# Patient Record
Sex: MALE | Race: WHITE | Employment: OTHER | URBAN - METROPOLITAN AREA
[De-identification: names, ages, dates, MRNs, and addresses within clinical notes are randomized per-mention and may not be internally consistent; named-entity substitution may affect disease eponyms.]

---

## 2021-07-14 ENCOUNTER — HOSPITAL ENCOUNTER (INPATIENT)
Age: 56
LOS: 2 days | Discharge: HOME | DRG: 247 | End: 2021-07-16
Attending: EMERGENCY MEDICINE | Admitting: HOSPITALIST
Payer: OTHER GOVERNMENT

## 2021-07-14 ENCOUNTER — APPOINTMENT (OUTPATIENT)
Dept: CT IMAGING | Age: 56
DRG: 247 | End: 2021-07-14
Payer: OTHER GOVERNMENT

## 2021-07-14 ENCOUNTER — APPOINTMENT (OUTPATIENT)
Dept: GENERAL RADIOLOGY | Age: 56
DRG: 247 | End: 2021-07-14
Payer: OTHER GOVERNMENT

## 2021-07-14 DIAGNOSIS — R07.9 CHEST PAIN, UNSPECIFIED TYPE: Primary | ICD-10-CM

## 2021-07-14 DIAGNOSIS — I70.0 ATHEROSCLEROSIS OF ABDOMINAL AORTA (HCC): ICD-10-CM

## 2021-07-14 DIAGNOSIS — R77.8 ELEVATED TROPONIN: ICD-10-CM

## 2021-07-14 LAB
ALBUMIN SERPL-MCNC: 4.9 GM/DL (ref 3.4–5)
ALP BLD-CCNC: 79 IU/L (ref 40–129)
ALT SERPL-CCNC: 21 U/L (ref 10–40)
ANION GAP SERPL CALCULATED.3IONS-SCNC: 12 MMOL/L (ref 4–16)
AST SERPL-CCNC: 27 IU/L (ref 15–37)
BASOPHILS ABSOLUTE: 0.1 K/CU MM
BASOPHILS RELATIVE PERCENT: 0.5 % (ref 0–1)
BILIRUB SERPL-MCNC: 1 MG/DL (ref 0–1)
BILIRUBIN DIRECT: 0.2 MG/DL (ref 0–0.3)
BILIRUBIN, INDIRECT: 0.8 MG/DL (ref 0–0.7)
BUN BLDV-MCNC: 16 MG/DL (ref 6–23)
CALCIUM SERPL-MCNC: 9.6 MG/DL (ref 8.3–10.6)
CHLORIDE BLD-SCNC: 98 MMOL/L (ref 99–110)
CO2: 26 MMOL/L (ref 21–32)
CREAT SERPL-MCNC: 1.2 MG/DL (ref 0.9–1.3)
DIFFERENTIAL TYPE: ABNORMAL
EOSINOPHILS ABSOLUTE: 0.2 K/CU MM
EOSINOPHILS RELATIVE PERCENT: 1.5 % (ref 0–3)
GFR AFRICAN AMERICAN: >60 ML/MIN/1.73M2
GFR NON-AFRICAN AMERICAN: >60 ML/MIN/1.73M2
GLUCOSE BLD-MCNC: 88 MG/DL (ref 70–99)
HCT VFR BLD CALC: 42.2 % (ref 42–52)
HEMOGLOBIN: 14.1 GM/DL (ref 13.5–18)
IMMATURE NEUTROPHIL %: 0.2 % (ref 0–0.43)
LIPASE: 12 IU/L (ref 13–60)
LYMPHOCYTES ABSOLUTE: 2.2 K/CU MM
LYMPHOCYTES RELATIVE PERCENT: 20.9 % (ref 24–44)
MCH RBC QN AUTO: 30.9 PG (ref 27–31)
MCHC RBC AUTO-ENTMCNC: 33.4 % (ref 32–36)
MCV RBC AUTO: 92.3 FL (ref 78–100)
MONOCYTES ABSOLUTE: 0.8 K/CU MM
MONOCYTES RELATIVE PERCENT: 7.5 % (ref 0–4)
NUCLEATED RBC %: 0 %
PDW BLD-RTO: 12.7 % (ref 11.7–14.9)
PLATELET # BLD: 272 K/CU MM (ref 140–440)
PMV BLD AUTO: 8.9 FL (ref 7.5–11.1)
POTASSIUM SERPL-SCNC: 3.6 MMOL/L (ref 3.5–5.1)
PRO-BNP: 123.8 PG/ML
RBC # BLD: 4.57 M/CU MM (ref 4.6–6.2)
SEGMENTED NEUTROPHILS ABSOLUTE COUNT: 7.2 K/CU MM
SEGMENTED NEUTROPHILS RELATIVE PERCENT: 69.4 % (ref 36–66)
SODIUM BLD-SCNC: 136 MMOL/L (ref 135–145)
TOTAL IMMATURE NEUTOROPHIL: 0.02 K/CU MM
TOTAL NUCLEATED RBC: 0 K/CU MM
TOTAL PROTEIN: 7.2 GM/DL (ref 6.4–8.2)
TROPONIN T: 0.08 NG/ML
TROPONIN T: 0.24 NG/ML
WBC # BLD: 10.4 K/CU MM (ref 4–10.5)

## 2021-07-14 PROCEDURE — 83690 ASSAY OF LIPASE: CPT

## 2021-07-14 PROCEDURE — 6370000000 HC RX 637 (ALT 250 FOR IP): Performed by: PHYSICIAN ASSISTANT

## 2021-07-14 PROCEDURE — 6360000004 HC RX CONTRAST MEDICATION: Performed by: EMERGENCY MEDICINE

## 2021-07-14 PROCEDURE — 80076 HEPATIC FUNCTION PANEL: CPT

## 2021-07-14 PROCEDURE — 85025 COMPLETE CBC W/AUTO DIFF WBC: CPT

## 2021-07-14 PROCEDURE — 93005 ELECTROCARDIOGRAM TRACING: CPT | Performed by: PHYSICIAN ASSISTANT

## 2021-07-14 PROCEDURE — 96374 THER/PROPH/DIAG INJ IV PUSH: CPT

## 2021-07-14 PROCEDURE — 6370000000 HC RX 637 (ALT 250 FOR IP): Performed by: NURSE PRACTITIONER

## 2021-07-14 PROCEDURE — 93005 ELECTROCARDIOGRAM TRACING: CPT | Performed by: EMERGENCY MEDICINE

## 2021-07-14 PROCEDURE — 2140000000 HC CCU INTERMEDIATE R&B

## 2021-07-14 PROCEDURE — 85027 COMPLETE CBC AUTOMATED: CPT

## 2021-07-14 PROCEDURE — 80048 BASIC METABOLIC PNL TOTAL CA: CPT

## 2021-07-14 PROCEDURE — 71045 X-RAY EXAM CHEST 1 VIEW: CPT

## 2021-07-14 PROCEDURE — 36415 COLL VENOUS BLD VENIPUNCTURE: CPT

## 2021-07-14 PROCEDURE — 74174 CTA ABD&PLVS W/CONTRAST: CPT

## 2021-07-14 PROCEDURE — 99285 EMERGENCY DEPT VISIT HI MDM: CPT

## 2021-07-14 PROCEDURE — 83880 ASSAY OF NATRIURETIC PEPTIDE: CPT

## 2021-07-14 PROCEDURE — 6360000002 HC RX W HCPCS: Performed by: PHYSICIAN ASSISTANT

## 2021-07-14 PROCEDURE — 84484 ASSAY OF TROPONIN QUANT: CPT

## 2021-07-14 RX ORDER — TRAMADOL HYDROCHLORIDE 50 MG/1
50 TABLET ORAL EVERY 12 HOURS
Status: DISCONTINUED | OUTPATIENT
Start: 2021-07-14 | End: 2021-07-16 | Stop reason: HOSPADM

## 2021-07-14 RX ORDER — ONDANSETRON 4 MG/1
4 TABLET, ORALLY DISINTEGRATING ORAL EVERY 8 HOURS PRN
Status: DISCONTINUED | OUTPATIENT
Start: 2021-07-14 | End: 2021-07-16 | Stop reason: HOSPADM

## 2021-07-14 RX ORDER — HEPARIN SODIUM 1000 [USP'U]/ML
4000 INJECTION, SOLUTION INTRAVENOUS; SUBCUTANEOUS ONCE
Status: COMPLETED | OUTPATIENT
Start: 2021-07-14 | End: 2021-07-15

## 2021-07-14 RX ORDER — POLYETHYLENE GLYCOL 3350 17 G/17G
17 POWDER, FOR SOLUTION ORAL DAILY PRN
Status: DISCONTINUED | OUTPATIENT
Start: 2021-07-14 | End: 2021-07-16 | Stop reason: HOSPADM

## 2021-07-14 RX ORDER — ONDANSETRON 2 MG/ML
4 INJECTION INTRAMUSCULAR; INTRAVENOUS ONCE
Status: COMPLETED | OUTPATIENT
Start: 2021-07-14 | End: 2021-07-14

## 2021-07-14 RX ORDER — TRAMADOL HYDROCHLORIDE 50 MG/1
50 TABLET ORAL EVERY 12 HOURS
COMMUNITY

## 2021-07-14 RX ORDER — ASPIRIN 81 MG/1
81 TABLET ORAL DAILY
Status: DISCONTINUED | OUTPATIENT
Start: 2021-07-15 | End: 2021-07-15 | Stop reason: SDUPTHER

## 2021-07-14 RX ORDER — HEPARIN SODIUM 10000 [USP'U]/100ML
5-30 INJECTION, SOLUTION INTRAVENOUS CONTINUOUS
Status: DISCONTINUED | OUTPATIENT
Start: 2021-07-14 | End: 2021-07-15

## 2021-07-14 RX ORDER — CLOPIDOGREL BISULFATE 75 MG/1
300 TABLET ORAL ONCE
Status: COMPLETED | OUTPATIENT
Start: 2021-07-14 | End: 2021-07-14

## 2021-07-14 RX ORDER — ONDANSETRON 2 MG/ML
4 INJECTION INTRAMUSCULAR; INTRAVENOUS EVERY 6 HOURS PRN
Status: DISCONTINUED | OUTPATIENT
Start: 2021-07-14 | End: 2021-07-16 | Stop reason: HOSPADM

## 2021-07-14 RX ORDER — CLOPIDOGREL BISULFATE 75 MG/1
75 TABLET ORAL DAILY
Status: DISCONTINUED | OUTPATIENT
Start: 2021-07-15 | End: 2021-07-15 | Stop reason: SDUPTHER

## 2021-07-14 RX ORDER — HEPARIN SODIUM 10000 [USP'U]/100ML
5-30 INJECTION, SOLUTION INTRAVENOUS CONTINUOUS
Status: DISCONTINUED | OUTPATIENT
Start: 2021-07-14 | End: 2021-07-14

## 2021-07-14 RX ORDER — HEPARIN SODIUM 1000 [USP'U]/ML
4000 INJECTION, SOLUTION INTRAVENOUS; SUBCUTANEOUS PRN
Status: DISCONTINUED | OUTPATIENT
Start: 2021-07-14 | End: 2021-07-15

## 2021-07-14 RX ORDER — GABAPENTIN 300 MG/1
300 CAPSULE ORAL 3 TIMES DAILY
Status: DISCONTINUED | OUTPATIENT
Start: 2021-07-14 | End: 2021-07-16 | Stop reason: HOSPADM

## 2021-07-14 RX ORDER — ACETAMINOPHEN 650 MG/1
650 SUPPOSITORY RECTAL EVERY 6 HOURS PRN
Status: DISCONTINUED | OUTPATIENT
Start: 2021-07-14 | End: 2021-07-16 | Stop reason: HOSPADM

## 2021-07-14 RX ORDER — SODIUM CHLORIDE 0.9 % (FLUSH) 0.9 %
5-40 SYRINGE (ML) INJECTION EVERY 12 HOURS SCHEDULED
Status: DISCONTINUED | OUTPATIENT
Start: 2021-07-14 | End: 2021-07-16 | Stop reason: HOSPADM

## 2021-07-14 RX ORDER — HEPARIN SODIUM 1000 [USP'U]/ML
2000 INJECTION, SOLUTION INTRAVENOUS; SUBCUTANEOUS PRN
Status: DISCONTINUED | OUTPATIENT
Start: 2021-07-14 | End: 2021-07-15

## 2021-07-14 RX ORDER — ACETAMINOPHEN 325 MG/1
650 TABLET ORAL EVERY 6 HOURS PRN
Status: DISCONTINUED | OUTPATIENT
Start: 2021-07-14 | End: 2021-07-16 | Stop reason: HOSPADM

## 2021-07-14 RX ORDER — AMLODIPINE BESYLATE 10 MG/1
10 TABLET ORAL DAILY
Status: DISCONTINUED | OUTPATIENT
Start: 2021-07-15 | End: 2021-07-16 | Stop reason: HOSPADM

## 2021-07-14 RX ORDER — SODIUM CHLORIDE 9 MG/ML
25 INJECTION, SOLUTION INTRAVENOUS PRN
Status: DISCONTINUED | OUTPATIENT
Start: 2021-07-14 | End: 2021-07-16 | Stop reason: HOSPADM

## 2021-07-14 RX ORDER — NITROGLYCERIN 0.4 MG/1
0.4 TABLET SUBLINGUAL EVERY 5 MIN PRN
Status: COMPLETED | OUTPATIENT
Start: 2021-07-14 | End: 2021-07-14

## 2021-07-14 RX ORDER — SODIUM CHLORIDE 0.9 % (FLUSH) 0.9 %
5-40 SYRINGE (ML) INJECTION PRN
Status: DISCONTINUED | OUTPATIENT
Start: 2021-07-14 | End: 2021-07-16 | Stop reason: HOSPADM

## 2021-07-14 RX ORDER — ATORVASTATIN CALCIUM 40 MG/1
40 TABLET, FILM COATED ORAL NIGHTLY
Status: DISCONTINUED | OUTPATIENT
Start: 2021-07-14 | End: 2021-07-15

## 2021-07-14 RX ORDER — CLOPIDOGREL BISULFATE 75 MG/1
300 TABLET ORAL DAILY
Status: DISCONTINUED | OUTPATIENT
Start: 2021-07-14 | End: 2021-07-14

## 2021-07-14 RX ORDER — ASPIRIN 81 MG/1
324 TABLET, CHEWABLE ORAL ONCE
Status: COMPLETED | OUTPATIENT
Start: 2021-07-14 | End: 2021-07-14

## 2021-07-14 RX ADMIN — GABAPENTIN 300 MG: 300 CAPSULE ORAL at 23:42

## 2021-07-14 RX ADMIN — ATORVASTATIN CALCIUM 40 MG: 40 TABLET, FILM COATED ORAL at 23:42

## 2021-07-14 RX ADMIN — NITROGLYCERIN 0.4 MG: 0.4 TABLET, ORALLY DISINTEGRATING SUBLINGUAL at 22:09

## 2021-07-14 RX ADMIN — ONDANSETRON 4 MG: 2 INJECTION INTRAMUSCULAR; INTRAVENOUS at 19:02

## 2021-07-14 RX ADMIN — TRAMADOL HYDROCHLORIDE 50 MG: 50 TABLET, FILM COATED ORAL at 23:42

## 2021-07-14 RX ADMIN — CLOPIDOGREL BISULFATE 300 MG: 75 TABLET ORAL at 23:41

## 2021-07-14 RX ADMIN — ASPIRIN 324 MG: 81 TABLET, CHEWABLE ORAL at 19:02

## 2021-07-14 RX ADMIN — IOPAMIDOL 80 ML: 755 INJECTION, SOLUTION INTRAVENOUS at 19:55

## 2021-07-14 RX ADMIN — NITROGLYCERIN 0.4 MG: 0.4 TABLET, ORALLY DISINTEGRATING SUBLINGUAL at 23:30

## 2021-07-14 RX ADMIN — NITROGLYCERIN 0.4 MG: 0.4 TABLET, ORALLY DISINTEGRATING SUBLINGUAL at 19:02

## 2021-07-14 RX ADMIN — SERTRALINE HYDROCHLORIDE 50 MG: 50 TABLET ORAL at 23:58

## 2021-07-14 ASSESSMENT — PAIN SCALES - GENERAL
PAINLEVEL_OUTOF10: 7
PAINLEVEL_OUTOF10: 7

## 2021-07-14 ASSESSMENT — PAIN DESCRIPTION - FREQUENCY: FREQUENCY: CONTINUOUS

## 2021-07-14 ASSESSMENT — PAIN DESCRIPTION - DESCRIPTORS: DESCRIPTORS: PRESSURE

## 2021-07-14 ASSESSMENT — PAIN DESCRIPTION - LOCATION: LOCATION: CHEST;BACK

## 2021-07-14 ASSESSMENT — HEART SCORE: ECG: 0

## 2021-07-14 NOTE — ED PROVIDER NOTES
EMERGENCY DEPARTMENT ENCOUNTER        PCP: Joss Kellogg MD    CHIEF COMPLAINT    Chief Complaint   Patient presents with    Chest Pain     shortness of breath, radiating into back       Of note, this patient was also evaluated by the attending physician, Dr. Zahira Mendez. HPI      Kavin Kirby is a 49 y.o. male former smoker with PMH of HTN and reported history of stroke without deficits who presents with chest pain. Onset -3 days ago  Location -inferior sternal region  Duration -intermittent lasting 10 to 15 minutes at a time  Character -pressure  Aggravating/Alleviating factors -no aggravating or alleviating factors. Patient reports the pain seems to occur at random. He reports the pain woke him up overnight as well last night. Associate symptoms -nausea, diaphoresis, shortness of breath, lightheadedness  Radiation -radiates to upper back  Severity -7 out of 10 in severity when it occurs    Patient reports he has never seen cardiology before. Patient denies fever, chills, emesis. Patient denies lower extremity swelling, recent long travel, history of PE/DVT, exogenous hormone use, recent surgeries/hospitalizations, recent trauma, hemoptysis. REVIEW OF SYSTEMS    Constitutional:  Denies fever, chills. HENT:  Denies sore throat or ear pain   Cardiovascular:  +Chest Pain; Denies palpitations, syncope, extremity edema. Respiratory:  + SOB; Denies cough, hemoptysis    GI:  + nausea;  Denies abdominal pain, vomiting, or diarrhea  :  Denies any urinary symptoms  Musculoskeletal:  Denies back pain, extremity swelling  Skin:  Denies rash  Neurologic:  + lightheadedness; Denies dizziness, headache, focal weakness or sensory changes, vision changes, hearing changes, speech changes, numbness, tingling, memory loss, confusion  Endocrine:  Denies polyuria or polydypsia   Lymphatic:  Denies swollen glands     All other review of systems are negative  See HPI and nursing notes for additional per Week:     Minutes of Exercise per Session:    Stress:     Feeling of Stress :    Social Connections:     Frequency of Communication with Friends and Family:     Frequency of Social Gatherings with Friends and Family:     Attends Sikhism Services:     Active Member of Clubs or Organizations:     Attends Club or Organization Meetings:     Marital Status:    Intimate Partner Violence:     Fear of Current or Ex-Partner:     Emotionally Abused:     Physically Abused:     Sexually Abused:      No family history on file. PHYSICAL EXAM    VITAL SIGNS: /73   Pulse 64   Temp 98.3 °F (36.8 °C) (Oral)   Resp 19   Ht 5' 9\" (1.753 m)   Wt 185 lb (83.9 kg)   SpO2 96%   BMI 27.32 kg/m²    Constitutional:  Well developed, well nourished, no acute distress   HENT:  Atraumatic, moist mucus membranes  Neck/Lymphatics: supple, no JVD, no swollen nodes  Respiratory:  Lungs Clear, no retractions, no wheezing, rhonchi, rales  Cardiovascular:  Rate regular, regular Rhythm,  no murmurs/rubs/gallops. No carotid bruits or murmurs heard in carotids. There is mild tenderness to palpation of the inferior sternal region without overlying erythema or edema. No other chest wall tenderness palpation is present. Vascular: Radial pulses and DP pulses 2+ equal bilaterally  GI:  Soft, nontender, normal bowel sounds. No palpable masses. Musculoskeletal:  No edema, no deformities. No thigh/calf tenderness to palpation bilaterally. Compartments are soft in bilateral lower extremities.   Integument:  Skin warm and dry, no petechiae     Neurologic:    - Alert & oriented person, place, time, and situation, no speech difficulties or slurring.  - No obvious gross motor deficits  - Cranial nerves 2-12 grossly intact  - Negative meningeal signs.  - Sensation intact to light touch  - Strength 5/5 in upper and lower extremities bilaterally  - Normal finger to nose test bilaterally  - Rapid alternating movements intact      Psych: Pleasant affect, no hallucinations        LABS:  Results for orders placed or performed during the hospital encounter of 07/14/21   CBC Auto Differential   Result Value Ref Range    WBC 10.4 4.0 - 10.5 K/CU MM    RBC 4.57 (L) 4.6 - 6.2 M/CU MM    Hemoglobin 14.1 13.5 - 18.0 GM/DL    Hematocrit 42.2 42 - 52 %    MCV 92.3 78 - 100 FL    MCH 30.9 27 - 31 PG    MCHC 33.4 32.0 - 36.0 %    RDW 12.7 11.7 - 14.9 %    Platelets 322 311 - 445 K/CU MM    MPV 8.9 7.5 - 11.1 FL    Differential Type AUTOMATED DIFFERENTIAL     Segs Relative 69.4 (H) 36 - 66 %    Lymphocytes % 20.9 (L) 24 - 44 %    Monocytes % 7.5 (H) 0 - 4 %    Eosinophils % 1.5 0 - 3 %    Basophils % 0.5 0 - 1 %    Segs Absolute 7.2 K/CU MM    Lymphocytes Absolute 2.2 K/CU MM    Monocytes Absolute 0.8 K/CU MM    Eosinophils Absolute 0.2 K/CU MM    Basophils Absolute 0.1 K/CU MM    Nucleated RBC % 0.0 %    Total Nucleated RBC 0.0 K/CU MM    Total Immature Neutrophil 0.02 K/CU MM    Immature Neutrophil % 0.2 0 - 0.43 %   Basic Metabolic Panel w/ Reflex to MG   Result Value Ref Range    Sodium 136 135 - 145 MMOL/L    Potassium 3.6 3.5 - 5.1 MMOL/L    Chloride 98 (L) 99 - 110 mMol/L    CO2 26 21 - 32 MMOL/L    Anion Gap 12 4 - 16    BUN 16 6 - 23 MG/DL    CREATININE 1.2 0.9 - 1.3 MG/DL    Glucose 88 70 - 99 MG/DL    Calcium 9.6 8.3 - 10.6 MG/DL    GFR Non-African American >60 >60 mL/min/1.73m2    GFR African American >60 >60 mL/min/1.73m2   Troponin   Result Value Ref Range    Troponin T 0.084 (H) <0.01 NG/ML   Brain Natriuretic Peptide   Result Value Ref Range    Pro-.8 <300 PG/ML   Lipase   Result Value Ref Range    Lipase 12 (L) 13 - 60 IU/L   Hepatic Function Panel (LFTs)   Result Value Ref Range    Albumin 4.9 3.4 - 5.0 GM/DL    Total Bilirubin 1.0 0.0 - 1.0 MG/DL    Bilirubin, Direct 0.2 0.0 - 0.3 MG/DL    Bilirubin, Indirect 0.8 (H) 0 - 0.7 MG/DL    Alkaline Phosphatase 79 40 - 129 IU/L    AST 27 15 - 37 IU/L    ALT 21 10 - 40 U/L Total Protein 7.2 6.4 - 8.2 GM/DL         EKG: EKG Interpretation  Please see ED physician's note for EKG interpretation- Dr. Casie Oneill    Repeat EKG also obtained-see ED physician's note for EKG interpretation, Dr. Jodell Duverney   Final Result   1. No acute aortic injury or dissection involving either the thoracic or   abdominal aorta. Moderate to severe atherosclerosis of the abdominal aorta. 2. No acute intrathoracic or intra-abdominal process identified. XR CHEST PORTABLE   Final Result   1. No acute cardiopulmonary process identified. ED COURSE & MEDICAL DECISION MAKING       Vital signs and nursing notes reviewed during ED course. Patient care and presentation staffed with and seen in conjunction with supervising physician, Dr. Casie Oneill. Patient presents as above. Patient placed on telemetry monitoring as well as continuous pulse oximetry monitoring. While in the ED today, labs, imaging, and EKG were obtained. For EKG interpretations- please see ED physician's note. Labs reveal troponin is elevated at 0.084 but otherwise not clinically significant derangements. Chest xray obtained today and reveals no acute cardiopulmonary process. No pneumothorax, no consolidation concerning for pneumonia, no pleural effusion. Pulses are equal in all extremities, no ripping or tearing pain, no widened mediastinum, but given chest pain radiating to back will obtain CTA chest abdomen pelvis to rule out dissection. HEART score at least 4.   Given patient's elevated troponin I did consult with the on-call cardiologist as patient has no cardiology preference and discussed patient's case with Dr. Benjy Rodriguez.  We discussed patient's labs including elevated troponin, EKG was sent via [employer], and we discussed patient's signs and symptoms and past medical history that she recommends aspirin and trending of troponin at this time as he reports the EKG looks normal.  He does not wish to start heparin at this time. If patient's next troponin enzyme is rising then he would consider heparin. CTA chest, abdomen, pelvis reveals no acute aortic injury or dissection involving either the thoracic or abdominal aorta. There is moderate to severe atherosclerosis of the abdominal aorta present. No acute intrathoracic or intra-abdominal process identified per radiologist read. Patient treated with aspirin and nitro in the ED for chest pain with improvement. Patient will require admission for trending of troponins, serial EKGs, cardiology to see patient as well as further evaluation care. Therefore I consulted with hospitalist and we discussed the patient's case. Hospitalist, NATHAN MAX [facility] NP, agrees to admit the patient at this time for further evaluation and care for ACS rule out. All pertinent Lab data and radiographic results reviewed with patient at bedside. The patient and/or the family were informed of the results of any tests/labs/imaging, the treatment plan, and time was allotted to answer questions. Diagnosis, disposition, and treatment plan reviewed in detail with patient who understands and agrees to admission at this time. See chart for details of medications given during the ED stay. Clinical  IMPRESSION    1. Chest pain, unspecified type    2. Elevated troponin    3. Atherosclerosis of abdominal aorta (HCC)        PLAN:  Admission to the hospital    Comment: Please note this report has been produced using speech recognition software and may contain errors related to that system including errors in grammar, punctuation, and spelling, as well as words and phrases that may be inappropriate. If there are any questions or concerns please feel free to contact the dictating provider for clarification.        Lawayne Felty, PA-C  07/14/21 4433

## 2021-07-14 NOTE — ED PROVIDER NOTES
EKG Interpretation    Interpreted by emergency department physician from July 14 at 1702    Rhythm: normal sinus   Rate: normal  Axis: normal  Ectopy: none  Conduction: normal  ST Segments: no acute change  T Waves: no acute change  Q Waves: none    Clinical Impression: Normal sinus rhythm with a rate of 79 and a QTC of 451 with no clear ischemia or ectopy    MD Jose Nelson MD  07/14/21 1706    ATTENDING NOTE:    I discussed this patient's history and physical findings and reviewed the PA's findings with them, as well as performed an independent assessment and coordinated care with them. My additional findings are: Patient is currently with minimal chest discomfort but states that his chest pain has become more frequent and more severe over the past 3 days with the episodes lasting longer. Addendum: Troponin continuing to climb, hospitalist notified by PA as the patient has already been hospitalized per the hospitalist at time of this result. HISTORY OF PRESENT ILLNESS:  Chief Complaint   Patient presents with    Chest Pain     shortness of breath, radiating into back   . Vladislav Yan is a 49 y.o. male who presents with episodic chest pain, that is increasing in frequency and severity. When he has the chest pain is substernal, crushing with radiation to both shoulders and the posterior back. He also has pain at the epigastrium. He denies any trauma, fevers. When he has the pain he has dry heaving, shortness of breath. He denies any known history of DVT or PE.      PHYSICAL EXAM:  VITAL SIGNS:   ED Triage Vitals [07/14/21 1655]   Enc Vitals Group      /71      Pulse 75      Resp 16      Temp 98.3 °F (36.8 °C)      Temp Source Oral      SpO2 98 %      Weight 185 lb (83.9 kg)      Height 5' 9\" (1.753 m)      Head Circumference       Peak Flow       Pain Score       Pain Loc       Pain Edu? Excl. in [address]?       Vitals during ED course were reviewed and are as charted. Key Physical Exam Findings:    Constitutional: Family at bedside, patient active, pleasant    Eyes:  Conjunctiva normal, No discharge    HENT: Normocephalic, Atraumatic, Bilateral external ears normal, posterior oropharynx is nonerythematous and without exudate, uvula is midline, no trismus, no \"hot potato voice\" or dysphonia, oropharynx moist    Neck: Supple, no stridor, no grossly visible or palpable masses    Cardiovascular: Regular rate and rhythm, No murmurs, No rubs, No gallops    Pulmonary/Chest:  Normal breath sounds, No respiratory distress or accessory muscle use, No wheezing, crackles or rhonchi. Abdomen:  Soft, nondistended and nonrigid, No tenderness or peritoneal signs, No masses, normal bowel sounds    Back:  No midline point tenderness, No paraspinous muscle tenderness.   No CVA tenderness    Extremities:  No gross deformities, no edema, no tenderness    Neurologic:  Normal motor function, Normal sensory function, No focal deficits    Skin:  Warm, Dry, No erythema, No rash, No cyanosis, No mottling    Lymphatic:  No lymphadenopathy in the following location(s): cervical    Psychiatric:  Alert and oriented x3, Affect normal      EKG Interpretation    Interpreted by emergency department physician from July 14 at [address]: normal sinus   Rate: normal  Axis: normal  Ectopy: none  Conduction: normal  ST Segments: nonspecific changes  T Waves: no acute change  Q Waves: none    Clinical Impression: Normal sinus rhythm with a rate of 70, some movement, not having significant chest pain at time of repeat EKG, cardiology consulted    King Peoples MD      RADIOLOGY/PROCEDURES/LABS/MEDICATIONS ADMINISTERED:    I have reviewed and interpreted all of the currently available lab results from this visit (if applicable):  Results for orders placed or performed during the hospital encounter of 07/14/21   CBC Auto Differential   Result Value Ref Range    WBC 10.4 4.0 - 10.5 K/CU MM    RBC 4.57 (L) 4.6 - 6.2 M/CU MM    Hemoglobin 14.1 13.5 - 18.0 GM/DL    Hematocrit 42.2 42 - 52 %    MCV 92.3 78 - 100 FL    MCH 30.9 27 - 31 PG    MCHC 33.4 32.0 - 36.0 %    RDW 12.7 11.7 - 14.9 %    Platelets 762 283 - 745 K/CU MM    MPV 8.9 7.5 - 11.1 FL    Differential Type AUTOMATED DIFFERENTIAL     Segs Relative 69.4 (H) 36 - 66 %    Lymphocytes % 20.9 (L) 24 - 44 %    Monocytes % 7.5 (H) 0 - 4 %    Eosinophils % 1.5 0 - 3 %    Basophils % 0.5 0 - 1 %    Segs Absolute 7.2 K/CU MM    Lymphocytes Absolute 2.2 K/CU MM    Monocytes Absolute 0.8 K/CU MM    Eosinophils Absolute 0.2 K/CU MM    Basophils Absolute 0.1 K/CU MM    Nucleated RBC % 0.0 %    Total Nucleated RBC 0.0 K/CU MM    Total Immature Neutrophil 0.02 K/CU MM    Immature Neutrophil % 0.2 0 - 0.43 %   Basic Metabolic Panel w/ Reflex to MG   Result Value Ref Range    Sodium 136 135 - 145 MMOL/L    Potassium 3.6 3.5 - 5.1 MMOL/L    Chloride 98 (L) 99 - 110 mMol/L    CO2 26 21 - 32 MMOL/L    Anion Gap 12 4 - 16    BUN 16 6 - 23 MG/DL    CREATININE 1.2 0.9 - 1.3 MG/DL    Glucose 88 70 - 99 MG/DL    Calcium 9.6 8.3 - 10.6 MG/DL    GFR Non-African American >60 >60 mL/min/1.73m2    GFR African American >60 >60 mL/min/1.73m2   Troponin   Result Value Ref Range    Troponin T 0.084 (H) <0.01 NG/ML   Brain Natriuretic Peptide   Result Value Ref Range    Pro-.8 <300 PG/ML   Lipase   Result Value Ref Range    Lipase 12 (L) 13 - 60 IU/L   Hepatic Function Panel (LFTs)   Result Value Ref Range    Albumin 4.9 3.4 - 5.0 GM/DL    Total Bilirubin 1.0 0.0 - 1.0 MG/DL    Bilirubin, Direct 0.2 0.0 - 0.3 MG/DL    Bilirubin, Indirect 0.8 (H) 0 - 0.7 MG/DL    Alkaline Phosphatase 79 40 - 129 IU/L    AST 27 15 - 37 IU/L    ALT 21 10 - 40 U/L    Total Protein 7.2 6.4 - 8.2 GM/DL   EKG 12 Lead   Result Value Ref Range    Ventricular Rate 79 BPM    Atrial Rate 79 BPM    P-R Interval 178 ms    QRS Duration 106 ms    Q-T Interval 394 ms    QTc Calculation (Bazett) 451 ms    P Axis 39 IV  IP CONSULT TO CARDIOLOGY  IP CONSULT TO HOSPITALIST    I have personally viewed the imaging studies. The radiologist interpretation is:   CTA CHEST ABDOMEN PELVIS W CONTRAST   Final Result   1. No acute aortic injury or dissection involving either the thoracic or   abdominal aorta. Moderate to severe atherosclerosis of the abdominal aorta. 2. No acute intrathoracic or intra-abdominal process identified. XR CHEST PORTABLE   Final Result   1. No acute cardiopulmonary process identified. MEDICATIONS ADMINISTERED:  Medications   nitroGLYCERIN (NITROSTAT) SL tablet 0.4 mg (0.4 mg Sublingual Given 7/14/21 1902)   aspirin chewable tablet 324 mg (324 mg Oral Given 7/14/21 1902)   ondansetron (ZOFRAN) injection 4 mg (4 mg Intravenous Given 7/14/21 1902)   iopamidol (ISOVUE-370) 76 % injection 80 mL (80 mLs Intravenous Given 7/14/21 1955)         PLAN/ED COURSE:  Last Vitals: /73   Pulse 64   Temp 98.3 °F (36.8 °C) (Oral)   Resp 19   Ht 5' 9\" (1.753 m)   Wt 185 lb (83.9 kg)   SpO2 96%   BMI 27.32 kg/m²       81-year-old male presents with chest pain. The nature of his chest pain is very worrisome and he has a history of previous vascular disease and his CT scan does show arteriosclerotic disease of the aorta. We did consult cardiology based on the severity of his signs and symptoms and his nonzero troponin. He was feeling improved while in the department here, he was asked to alert us if his chest pain recurred so that we could repeat a stat EKG. I rechecked the patient x3 during his stay in the department and he is resting comfortably. His nausea and pain are controlled. His CT scan does not show evidence of aortic dissection, PE, pneumothorax. I have concerned this is underlying cardiac disease, angina or ACS. Patient be hospitalized per the hospitalist, see PA note for discussions regarding plans with cardiology.     Critical care time of 18 minutes, excluding other billable procedures. This included multiple reevaluations, advanced imaging, laboratory studies and plan for disposition. Clinical Impression:  1. Chest pain, unspecified type    2. Elevated troponin    3. Atherosclerosis of abdominal aorta ([address]        Disposition referral (if applicable):  No follow-up provider specified.     Disposition medications (if applicable):  New Prescriptions    No medications on file       ED Provider Disposition Time  DISPOSITION Decision To Admit 07/14/2021 08:52:08 PM            Electronically signed by Lisa Callahan MD on 7/14/2021 at 9:48 PM       Lisa Callahan MD  07/14/21 8116       Lisa Callahan MD  07/14/21 3601

## 2021-07-15 LAB
ACTIVATED CLOTTING TIME, LOW RANGE: >400 SEC
APTT: 107.8 SECONDS (ref 25.1–37.1)
APTT: 31.4 SECONDS (ref 25.1–37.1)
APTT: 43.3 SECONDS (ref 25.1–37.1)
APTT: >240 SECONDS (ref 25.1–37.1)
EKG ATRIAL RATE: 65 BPM
EKG ATRIAL RATE: 70 BPM
EKG ATRIAL RATE: 71 BPM
EKG ATRIAL RATE: 79 BPM
EKG DIAGNOSIS: NORMAL
EKG P AXIS: 34 DEGREES
EKG P AXIS: 39 DEGREES
EKG P AXIS: 51 DEGREES
EKG P AXIS: 56 DEGREES
EKG P-R INTERVAL: 176 MS
EKG P-R INTERVAL: 178 MS
EKG P-R INTERVAL: 182 MS
EKG P-R INTERVAL: 194 MS
EKG Q-T INTERVAL: 394 MS
EKG Q-T INTERVAL: 406 MS
EKG Q-T INTERVAL: 408 MS
EKG Q-T INTERVAL: 422 MS
EKG QRS DURATION: 106 MS
EKG QRS DURATION: 106 MS
EKG QRS DURATION: 108 MS
EKG QRS DURATION: 88 MS
EKG QTC CALCULATION (BAZETT): 438 MS
EKG QTC CALCULATION (BAZETT): 440 MS
EKG QTC CALCULATION (BAZETT): 441 MS
EKG QTC CALCULATION (BAZETT): 451 MS
EKG R AXIS: -2 DEGREES
EKG R AXIS: -4 DEGREES
EKG R AXIS: 0 DEGREES
EKG R AXIS: 5 DEGREES
EKG T AXIS: 14 DEGREES
EKG T AXIS: 19 DEGREES
EKG T AXIS: 28 DEGREES
EKG T AXIS: 7 DEGREES
EKG VENTRICULAR RATE: 65 BPM
EKG VENTRICULAR RATE: 70 BPM
EKG VENTRICULAR RATE: 71 BPM
EKG VENTRICULAR RATE: 79 BPM
HCT VFR BLD CALC: 39.4 % (ref 42–52)
HEMOGLOBIN: 13.6 GM/DL (ref 13.5–18)
MCH RBC QN AUTO: 32 PG (ref 27–31)
MCHC RBC AUTO-ENTMCNC: 34.5 % (ref 32–36)
MCV RBC AUTO: 92.7 FL (ref 78–100)
PDW BLD-RTO: 12.9 % (ref 11.7–14.9)
PLATELET # BLD: 233 K/CU MM (ref 140–440)
PMV BLD AUTO: 8.8 FL (ref 7.5–11.1)
RBC # BLD: 4.25 M/CU MM (ref 4.6–6.2)
SARS-COV-2, NAAT: NOT DETECTED
SOURCE: NORMAL
TROPONIN T: 0.41 NG/ML
WBC # BLD: 8.5 K/CU MM (ref 4–10.5)

## 2021-07-15 PROCEDURE — 2500000003 HC RX 250 WO HCPCS: Performed by: INTERNAL MEDICINE

## 2021-07-15 PROCEDURE — 36415 COLL VENOUS BLD VENIPUNCTURE: CPT

## 2021-07-15 PROCEDURE — B2151ZZ FLUOROSCOPY OF LEFT HEART USING LOW OSMOLAR CONTRAST: ICD-10-PCS | Performed by: INTERNAL MEDICINE

## 2021-07-15 PROCEDURE — 93010 ELECTROCARDIOGRAM REPORT: CPT | Performed by: INTERNAL MEDICINE

## 2021-07-15 PROCEDURE — 84484 ASSAY OF TROPONIN QUANT: CPT

## 2021-07-15 PROCEDURE — 2140000000 HC CCU INTERMEDIATE R&B

## 2021-07-15 PROCEDURE — 87635 SARS-COV-2 COVID-19 AMP PRB: CPT

## 2021-07-15 PROCEDURE — 93005 ELECTROCARDIOGRAM TRACING: CPT | Performed by: EMERGENCY MEDICINE

## 2021-07-15 PROCEDURE — C1894 INTRO/SHEATH, NON-LASER: HCPCS

## 2021-07-15 PROCEDURE — 94761 N-INVAS EAR/PLS OXIMETRY MLT: CPT

## 2021-07-15 PROCEDURE — 92928 PRQ TCAT PLMT NTRAC ST 1 LES: CPT | Performed by: INTERNAL MEDICINE

## 2021-07-15 PROCEDURE — 6360000002 HC RX W HCPCS

## 2021-07-15 PROCEDURE — 93458 L HRT ARTERY/VENTRICLE ANGIO: CPT | Performed by: INTERNAL MEDICINE

## 2021-07-15 PROCEDURE — 6370000000 HC RX 637 (ALT 250 FOR IP): Performed by: INTERNAL MEDICINE

## 2021-07-15 PROCEDURE — 85347 COAGULATION TIME ACTIVATED: CPT

## 2021-07-15 PROCEDURE — 6370000000 HC RX 637 (ALT 250 FOR IP): Performed by: PHYSICIAN ASSISTANT

## 2021-07-15 PROCEDURE — C1769 GUIDE WIRE: HCPCS

## 2021-07-15 PROCEDURE — 6360000004 HC RX CONTRAST MEDICATION

## 2021-07-15 PROCEDURE — C1874 STENT, COATED/COV W/DEL SYS: HCPCS

## 2021-07-15 PROCEDURE — 2580000003 HC RX 258: Performed by: INTERNAL MEDICINE

## 2021-07-15 PROCEDURE — B2111ZZ FLUOROSCOPY OF MULTIPLE CORONARY ARTERIES USING LOW OSMOLAR CONTRAST: ICD-10-PCS | Performed by: INTERNAL MEDICINE

## 2021-07-15 PROCEDURE — 93005 ELECTROCARDIOGRAM TRACING: CPT | Performed by: INTERNAL MEDICINE

## 2021-07-15 PROCEDURE — 6360000002 HC RX W HCPCS: Performed by: INTERNAL MEDICINE

## 2021-07-15 PROCEDURE — 93458 L HRT ARTERY/VENTRICLE ANGIO: CPT

## 2021-07-15 PROCEDURE — 6370000000 HC RX 637 (ALT 250 FOR IP)

## 2021-07-15 PROCEDURE — 027034Z DILATION OF CORONARY ARTERY, ONE ARTERY WITH DRUG-ELUTING INTRALUMINAL DEVICE, PERCUTANEOUS APPROACH: ICD-10-PCS | Performed by: INTERNAL MEDICINE

## 2021-07-15 PROCEDURE — 85730 THROMBOPLASTIN TIME PARTIAL: CPT

## 2021-07-15 PROCEDURE — 2500000003 HC RX 250 WO HCPCS

## 2021-07-15 PROCEDURE — 2580000003 HC RX 258: Performed by: PHYSICIAN ASSISTANT

## 2021-07-15 PROCEDURE — C1887 CATHETER, GUIDING: HCPCS

## 2021-07-15 PROCEDURE — 99291 CRITICAL CARE FIRST HOUR: CPT | Performed by: INTERNAL MEDICINE

## 2021-07-15 PROCEDURE — 2709999900 HC NON-CHARGEABLE SUPPLY

## 2021-07-15 PROCEDURE — 4A023N7 MEASUREMENT OF CARDIAC SAMPLING AND PRESSURE, LEFT HEART, PERCUTANEOUS APPROACH: ICD-10-PCS | Performed by: INTERNAL MEDICINE

## 2021-07-15 PROCEDURE — 6360000002 HC RX W HCPCS: Performed by: PHYSICIAN ASSISTANT

## 2021-07-15 PROCEDURE — 92928 PRQ TCAT PLMT NTRAC ST 1 LES: CPT

## 2021-07-15 RX ORDER — CLOPIDOGREL BISULFATE 75 MG/1
75 TABLET ORAL DAILY
Status: DISCONTINUED | OUTPATIENT
Start: 2021-07-16 | End: 2021-07-16 | Stop reason: HOSPADM

## 2021-07-15 RX ORDER — ACETAMINOPHEN 325 MG/1
650 TABLET ORAL EVERY 4 HOURS PRN
Status: DISCONTINUED | OUTPATIENT
Start: 2021-07-15 | End: 2021-07-16 | Stop reason: HOSPADM

## 2021-07-15 RX ORDER — ASPIRIN 81 MG/1
81 TABLET, CHEWABLE ORAL DAILY
Status: DISCONTINUED | OUTPATIENT
Start: 2021-07-16 | End: 2021-07-16 | Stop reason: HOSPADM

## 2021-07-15 RX ORDER — SODIUM CHLORIDE 9 MG/ML
INJECTION, SOLUTION INTRAVENOUS CONTINUOUS
Status: DISCONTINUED | OUTPATIENT
Start: 2021-07-15 | End: 2021-07-16 | Stop reason: HOSPADM

## 2021-07-15 RX ORDER — SODIUM CHLORIDE 9 MG/ML
25 INJECTION, SOLUTION INTRAVENOUS PRN
Status: DISCONTINUED | OUTPATIENT
Start: 2021-07-15 | End: 2021-07-16 | Stop reason: HOSPADM

## 2021-07-15 RX ORDER — SODIUM CHLORIDE 0.9 % (FLUSH) 0.9 %
5-40 SYRINGE (ML) INJECTION EVERY 12 HOURS SCHEDULED
Status: DISCONTINUED | OUTPATIENT
Start: 2021-07-15 | End: 2021-07-16 | Stop reason: HOSPADM

## 2021-07-15 RX ORDER — HYDRALAZINE HYDROCHLORIDE 25 MG/1
25 TABLET, FILM COATED ORAL 2 TIMES DAILY
COMMUNITY

## 2021-07-15 RX ORDER — NITROGLYCERIN 20 MG/100ML
5-200 INJECTION INTRAVENOUS CONTINUOUS
Status: DISCONTINUED | OUTPATIENT
Start: 2021-07-15 | End: 2021-07-15

## 2021-07-15 RX ORDER — ATORVASTATIN CALCIUM 80 MG/1
80 TABLET, FILM COATED ORAL NIGHTLY
Status: DISCONTINUED | OUTPATIENT
Start: 2021-07-15 | End: 2021-07-16 | Stop reason: HOSPADM

## 2021-07-15 RX ORDER — MORPHINE SULFATE 4 MG/ML
1 INJECTION, SOLUTION INTRAMUSCULAR; INTRAVENOUS
Status: DISCONTINUED | OUTPATIENT
Start: 2021-07-15 | End: 2021-07-16 | Stop reason: HOSPADM

## 2021-07-15 RX ORDER — SODIUM CHLORIDE 0.9 % (FLUSH) 0.9 %
5-40 SYRINGE (ML) INJECTION PRN
Status: DISCONTINUED | OUTPATIENT
Start: 2021-07-15 | End: 2021-07-16 | Stop reason: HOSPADM

## 2021-07-15 RX ADMIN — Medication 10 ML: at 08:04

## 2021-07-15 RX ADMIN — Medication 10 ML: at 21:46

## 2021-07-15 RX ADMIN — NITROGLYCERIN 5 MCG/MIN: 20 INJECTION INTRAVENOUS at 02:34

## 2021-07-15 RX ADMIN — MORPHINE SULFATE 1 MG: 4 INJECTION, SOLUTION INTRAMUSCULAR; INTRAVENOUS at 04:34

## 2021-07-15 RX ADMIN — MORPHINE SULFATE 1 MG: 4 INJECTION, SOLUTION INTRAMUSCULAR; INTRAVENOUS at 17:44

## 2021-07-15 RX ADMIN — MORPHINE SULFATE 1 MG: 4 INJECTION, SOLUTION INTRAMUSCULAR; INTRAVENOUS at 08:04

## 2021-07-15 RX ADMIN — TRAMADOL HYDROCHLORIDE 50 MG: 50 TABLET, FILM COATED ORAL at 11:44

## 2021-07-15 RX ADMIN — HEPARIN SODIUM 4000 UNITS: 1000 INJECTION INTRAVENOUS; SUBCUTANEOUS at 00:02

## 2021-07-15 RX ADMIN — AMLODIPINE BESYLATE 10 MG: 10 TABLET ORAL at 08:04

## 2021-07-15 RX ADMIN — TRAMADOL HYDROCHLORIDE 50 MG: 50 TABLET, FILM COATED ORAL at 21:44

## 2021-07-15 RX ADMIN — GABAPENTIN 300 MG: 300 CAPSULE ORAL at 08:04

## 2021-07-15 RX ADMIN — SODIUM CHLORIDE: 9 INJECTION, SOLUTION INTRAVENOUS at 17:56

## 2021-07-15 RX ADMIN — ATORVASTATIN CALCIUM 80 MG: 80 TABLET, FILM COATED ORAL at 21:43

## 2021-07-15 RX ADMIN — SERTRALINE HYDROCHLORIDE 50 MG: 50 TABLET ORAL at 21:43

## 2021-07-15 RX ADMIN — METOPROLOL TARTRATE 12.5 MG: 25 TABLET, FILM COATED ORAL at 21:38

## 2021-07-15 RX ADMIN — MORPHINE SULFATE 1 MG: 4 INJECTION, SOLUTION INTRAMUSCULAR; INTRAVENOUS at 02:51

## 2021-07-15 RX ADMIN — METOPROLOL TARTRATE 12.5 MG: 25 TABLET, FILM COATED ORAL at 08:04

## 2021-07-15 RX ADMIN — MORPHINE SULFATE 1 MG: 4 INJECTION, SOLUTION INTRAMUSCULAR; INTRAVENOUS at 10:10

## 2021-07-15 RX ADMIN — GABAPENTIN 300 MG: 300 CAPSULE ORAL at 21:43

## 2021-07-15 RX ADMIN — SODIUM CHLORIDE: 9 INJECTION, SOLUTION INTRAVENOUS at 11:44

## 2021-07-15 RX ADMIN — HEPARIN SODIUM AND DEXTROSE 11 UNITS/KG/HR: 10000; 5 INJECTION INTRAVENOUS at 00:06

## 2021-07-15 RX ADMIN — GABAPENTIN 300 MG: 300 CAPSULE ORAL at 13:13

## 2021-07-15 SDOH — HEALTH STABILITY: PHYSICAL HEALTH: ON AVERAGE, HOW MANY MINUTES DO YOU ENGAGE IN EXERCISE AT THIS LEVEL?: 30 MIN

## 2021-07-15 SDOH — HEALTH STABILITY: PHYSICAL HEALTH: ON AVERAGE, HOW MANY DAYS PER WEEK DO YOU ENGAGE IN MODERATE TO STRENUOUS EXERCISE (LIKE A BRISK WALK)?: 3 DAYS

## 2021-07-15 SDOH — ECONOMIC STABILITY: FOOD INSECURITY: WITHIN THE PAST 12 MONTHS, YOU WORRIED THAT YOUR FOOD WOULD RUN OUT BEFORE YOU GOT MONEY TO BUY MORE.: NEVER TRUE

## 2021-07-15 SDOH — ECONOMIC STABILITY: INCOME INSECURITY: IN THE LAST 12 MONTHS, WAS THERE A TIME WHEN YOU WERE NOT ABLE TO PAY THE MORTGAGE OR RENT ON TIME?: NO

## 2021-07-15 SDOH — ECONOMIC STABILITY: TRANSPORTATION INSECURITY
IN THE PAST 12 MONTHS, HAS LACK OF TRANSPORTATION KEPT YOU FROM MEETINGS, WORK, OR FROM GETTING THINGS NEEDED FOR DAILY LIVING?: NO

## 2021-07-15 SDOH — ECONOMIC STABILITY: FOOD INSECURITY: WITHIN THE PAST 12 MONTHS, THE FOOD YOU BOUGHT JUST DIDN'T LAST AND YOU DIDN'T HAVE MONEY TO GET MORE.: NEVER TRUE

## 2021-07-15 SDOH — ECONOMIC STABILITY: TRANSPORTATION INSECURITY
IN THE PAST 12 MONTHS, HAS THE LACK OF TRANSPORTATION KEPT YOU FROM MEDICAL APPOINTMENTS OR FROM GETTING MEDICATIONS?: NO

## 2021-07-15 SDOH — ECONOMIC STABILITY: HOUSING INSECURITY: IN THE LAST 12 MONTHS, HOW MANY PLACES HAVE YOU LIVED?: 1

## 2021-07-15 SDOH — ECONOMIC STABILITY: HOUSING INSECURITY
IN THE LAST 12 MONTHS, WAS THERE A TIME WHEN YOU DID NOT HAVE A STEADY PLACE TO SLEEP OR SLEPT IN A SHELTER (INCLUDING NOW)?: NO

## 2021-07-15 ASSESSMENT — SOCIAL DETERMINANTS OF HEALTH (SDOH)
WITHIN THE LAST YEAR, HAVE YOU BEEN HUMILIATED OR EMOTIONALLY ABUSED IN OTHER WAYS BY YOUR PARTNER OR EX-PARTNER?: NO
HOW OFTEN DO YOU ATTEND CHURCH OR RELIGIOUS SERVICES?: MORE THAN 4 TIMES PER YEAR
WITHIN THE LAST YEAR, HAVE YOU BEEN AFRAID OF YOUR PARTNER OR EX-PARTNER?: NO
HOW HARD IS IT FOR YOU TO PAY FOR THE VERY BASICS LIKE FOOD, HOUSING, MEDICAL CARE, AND HEATING?: NOT HARD AT ALL
DO YOU BELONG TO ANY CLUBS OR ORGANIZATIONS SUCH AS CHURCH GROUPS UNIONS, FRATERNAL OR ATHLETIC GROUPS, OR SCHOOL GROUPS?: NO
HOW OFTEN DO YOU GET TOGETHER WITH FRIENDS OR RELATIVES?: ONCE A WEEK
WITHIN THE LAST YEAR, HAVE TO BEEN RAPED OR FORCED TO HAVE ANY KIND OF SEXUAL ACTIVITY BY YOUR PARTNER OR EX-PARTNER?: NO
IN A TYPICAL WEEK, HOW MANY TIMES DO YOU TALK ON THE PHONE WITH FAMILY, FRIENDS, OR NEIGHBORS?: MORE THAN THREE TIMES A WEEK
HOW OFTEN DO YOU ATTENT MEETINGS OF THE CLUB OR ORGANIZATION YOU BELONG TO?: 1 TO 4 TIMES PER YEAR
WITHIN THE LAST YEAR, HAVE YOU BEEN KICKED, HIT, SLAPPED, OR OTHERWISE PHYSICALLY HURT BY YOUR PARTNER OR EX-PARTNER?: NO

## 2021-07-15 ASSESSMENT — PAIN DESCRIPTION - LOCATION
LOCATION: CHEST
LOCATION: ARM;CHEST
LOCATION: CHEST
LOCATION: ARM;CHEST

## 2021-07-15 ASSESSMENT — PAIN DESCRIPTION - DESCRIPTORS
DESCRIPTORS: PRESSURE
DESCRIPTORS: ACHING;DISCOMFORT
DESCRIPTORS: PRESSURE
DESCRIPTORS: ACHING;DISCOMFORT

## 2021-07-15 ASSESSMENT — PAIN SCALES - GENERAL
PAINLEVEL_OUTOF10: 4
PAINLEVEL_OUTOF10: 7
PAINLEVEL_OUTOF10: 8
PAINLEVEL_OUTOF10: 7
PAINLEVEL_OUTOF10: 8
PAINLEVEL_OUTOF10: 4
PAINLEVEL_OUTOF10: 7
PAINLEVEL_OUTOF10: 7
PAINLEVEL_OUTOF10: 8

## 2021-07-15 ASSESSMENT — PATIENT HEALTH QUESTIONNAIRE - PHQ9
1. LITTLE INTEREST OR PLEASURE IN DOING THINGS: NOT AT ALL
SUM OF ALL RESPONSES TO PHQ9 QUESTIONS 1 & 2: 0
2. FEELING DOWN, DEPRESSED OR HOPELESS: NOT AT ALL
DEPRESSION UNABLE TO ASSESS: YES
1. LITTLE INTEREST OR PLEASURE IN DOING THINGS: NOT AT ALL
2. FEELING DOWN, DEPRESSED OR HOPELESS: NOT AT ALL
SUM OF ALL RESPONSES TO PHQ9 QUESTIONS 1 & 2: 0
DEPRESSION UNABLE TO ASSESS: YES
2. FEELING DOWN, DEPRESSED OR HOPELESS: NOT AT ALL
DEPRESSION UNABLE TO ASSESS: YES
SUM OF ALL RESPONSES TO PHQ9 QUESTIONS 1 & 2: 0
1. LITTLE INTEREST OR PLEASURE IN DOING THINGS: NOT AT ALL

## 2021-07-15 ASSESSMENT — LIFESTYLE VARIABLES
HOW OFTEN DO YOU HAVE A DRINK CONTAINING ALCOHOL: MONTHLY OR LESS
HOW MANY STANDARD DRINKS CONTAINING ALCOHOL DO YOU HAVE ON A TYPICAL DAY: 1 OR 2

## 2021-07-15 ASSESSMENT — PAIN DESCRIPTION - PAIN TYPE
TYPE: ACUTE PAIN

## 2021-07-15 ASSESSMENT — PAIN DESCRIPTION - FREQUENCY
FREQUENCY: CONTINUOUS

## 2021-07-15 NOTE — CARE COORDINATION
.CM has reviewed pt's chart for needs. CM screening shows that pt has PCP, insurance and is independent PTA. If needs arise please contact MISHA.   TE

## 2021-07-15 NOTE — CONSULTS
INPATIENT CARDIOLOGY CONSULT NOTE       Reason for consultation:  Chest pain ,  NSTEMI     Referring physician:  Herb Padilla MD     Primary care physician: Devika Sarmiento MD      Dear Herb Padilla MD Thank you for the consult    Chief Complaint   Patient presents with    Chest Pain     shortness of breath, radiating into back       History of present illness:Uday is a 49 y. o.year old who  presents with  Chief Complaint   Patient presents with    Chest Pain     shortness of breath, radiating into back     Pt is a 49 yr old m w hx of HTN, HLD, hx of  Stroke presents with 3 days hx of chest pain. Chest Pain:  Retrosternal, Pressure like, 7/10, exertional in nature, non radiating, associated with dyspnea on exertion, relieved with rest and nitroglycerin. EKG: NSR, no ischemic changes   Troponins elevated in delta     + marijuna use   No smoking or ETOH use      No established CAD      Past medical history:    has a past medical history of DDD (degenerative disc disease), thoracic, HNP (herniated nucleus pulposus with myelopathy), thoracic, Hypertension, MRSA (methicillin resistant staph aureus) culture positive, Osteoarthritis of thoracic spine, and Pseudomeningocele. Past surgical history:   has a past surgical history that includes back surgery; Femur Surgery (Left); and laminectomy. Social History:   reports that he has never smoked. He has quit using smokeless tobacco.  His smokeless tobacco use included chew. He reports current drug use. Drug: Marijuana.   Family history:   no family history of CAD, STROKE of DM    Allergies   Allergen Reactions    Fentanyl Nausea And Vomiting       nitroGLYCERIN 50 mg in dextrose 5% 250 mL infusion, Continuous  morphine sulfate (PF) injection 1 mg, Q1H PRN  amLODIPine (NORVASC) tablet 10 mg, Daily  gabapentin (NEURONTIN) capsule 300 mg, TID  sertraline (ZOLOFT) tablet 50 mg, Nightly  sodium chloride flush 0.9 % injection 5-40 mL, 2 times acetaminophen (TYLENOL) tablet 650 mg  650 mg Oral Q6H PRN Mya Allred PA-C        Or    acetaminophen (TYLENOL) suppository 650 mg  650 mg Rectal Q6H PRN Mya Allred, PA-C        aspirin EC tablet 81 mg  81 mg Oral Daily Mya Allred, PA-C        atorvastatin (LIPITOR) tablet 40 mg  40 mg Oral Nightly Mya Brigida, PA-C   40 mg at 07/14/21 2342    traMADol (ULTRAM) tablet 50 mg  50 mg Oral Q12H Mya , PA-C   50 mg at 07/14/21 2342    heparin (porcine) injection 4,000 Units  4,000 Units Intravenous PRN Mya Allred, PA-C        heparin (porcine) injection 2,000 Units  2,000 Units Intravenous PRN Mya Allred, PA-C        heparin 25,000 units in dextrose 5% 250 mL (premix) infusion  5-30 Units/kg/hr Intravenous Continuous Myaruss Allred PA-C 12.6 mL/hr at 07/15/21 0130 15 Units/kg/hr at 07/15/21 0130    clopidogrel (PLAVIX) tablet 75 mg  75 mg Oral Daily Ashley Louis APRN - CNP             Review of Systems:     · Constitutional: No Fever or Weight Loss   · Eyes: No Decreased Vision  · ENT: No Headaches, Hearing Loss or Vertigo  · Cardiovascular: + chest pain, + dyspnea on exertion, palpitations or loss of consciousness  · Respiratory: No cough or wheezing    · Gastrointestinal: No abdominal pain, appetite loss, blood in stools, constipation, diarrhea or heartburn  · Genitourinary: No dysuria, trouble voiding, or hematuria  · Musculoskeletal:  No gait disturbance, weakness or joint complaints  · Integumentary: No rash or pruritis  · Neurological: No TIA or stroke symptoms  · Psychiatric: No anxiety or depression  · Endocrine: No malaise, fatigue or temperature intolerance  · Hematologic/Lymphatic: No bleeding problems, blood clots or swollen lymph nodes  · Allergic/Immunologic: No nasal congestion or hives    All other systems were reviewed and were negative otherwise.          Physical Examination:      Vitals:    07/15/21 0557   BP:    Pulse: 73   Resp:    Temp:    SpO2:       Wt Readings from Last 3 Encounters:   07/15/21 193 lb 1.6 oz (87.6 kg)   02/11/16 180 lb (81.6 kg)   02/03/16 180 lb (81.6 kg)     Body mass index is 28.52 kg/m². General Appearance:  No distress, conversant  Constitutional:  Well developed, Well nourished  HEENT:  Normocephalic, Atraumatic, Oropharynx moist, No oral exudates,   Nose normal. Neck Supple Carotid: no carotid bruit  Eyes:  Conjunctiva normal, No discharge. Respiratory:    Normal breath sounds, No respiratory distress, No wheezing, no use of accessory muscles, diaphragm movement is normal  No chest Tenderness  Cardiovascular: S1-S2 No murmurs auscultated. No rubs, thrills or gallops. Normal  rhythm. Pedal pulses are normal. No pedal edema  GI:  Soft Non tender, non distended. :  no CVA tenderness. Musculoskeletal:   No tenderness, No cyanosis, No clubbing. Integument:  Warm, Dry, No erythema, No rash. Lymphatic:  No lymphadenopathy noted. Neurologic:  Alert & oriented x 3  No focal deficits noted. Psychiatric:  Affect normal, Judgment normal, Mood normal.       Lab Review     Recent Labs     07/14/21  2330   WBC 8.5   HGB 13.6   HCT 39.4*         Recent Labs     07/14/21  1706      K 3.6   CL 98*   CO2 26   BUN 16   CREATININE 1.2     Recent Labs     07/14/21  1706   AST 27   ALT 21   BILIDIR 0.2   BILITOT 1.0   ALKPHOS 79     No results for input(s): TROPONINI in the last 72 hours. No results found for: BNP  No results found for: INR, PROTIME      All labs, images, EKGs were personally reviewed      Assessment: 49 y. o.year old with PMH of  has a past medical history of DDD (degenerative disc disease), thoracic, HNP (herniated nucleus pulposus with myelopathy), thoracic, Hypertension, MRSA (methicillin resistant staph aureus) culture positive, Osteoarthritis of thoracic spine, and Pseudomeningocele. Recommendations:      1. NSTEMI   2. HTN  3. HLD   4.  Hx of stroke     ASA + Plavix   IV heparin bolus + gtt   IV Nitroglycerin gtt  IV Morphine   Start BB  Lipitor 40 mg   Echocardiogram   [facility] today , pros cons discussed, pt agreeable to procedure  Not vaccinated against covid, will perform rapid test      Thank you for the consult    Dr. Becker Courser  7/15/2021 7:34 AM           Critical time is performed by myself in 55 minutes exclusive of separately billable procedures. This time includes bedside physical exams and repeat evaluation, close medical management,  titration of IV drips, discussion with consultants, review of diagnostic testing results and monitoring for potential decompensation.

## 2021-07-15 NOTE — OP NOTE
RCA 99% DISTAL TO 0% WITH STENT  LAD ANX MILD DISEASE  EF NORMAL    DAPT FOR ONE YEAR  LOW BB OR DC IF BP IS LOW

## 2021-07-15 NOTE — PROGRESS NOTES
Hospitalist Progress Note      Name:  Karan Ruiz /Age/Sex: 1965  (49 y.o. male)   MRN & CSN:  [id number] & [id number] Admission Date/Time: 2021  6:25 PM   Location:  [address] [place] [address] PCP: Augusto Chambers MD         Hospital Day: 2    Assessment and Plan:   Karan Ruiz is a 49 y.o.  male  with history of HTN who presents with chest pain.      NSTEMI  - Rising trop yesterday; pain improved but still present this morning  - Heparin drip, Nitro drip started overnight  - Had Plavix load yesterday, continue ASA and Statin, Plavix  - NPO for cath today     Hypertension   - continue home amlodipine      Chronic Pain   - continue home gabapentin and tramadol     Diet Diet NPO   DVT Prophylaxis [] Lovenox, []  Heparin, [] SCDs, [] Ambulation   GI Prophylaxis [] PPI,  [] H2 Blocker,  [] Carafate,  [] Diet/Tube Feeds   Code Status Full Code   Disposition Patient requires continued admission due to    MDM [] Low, [] Moderate,[]  High  Patient's risk as above due to      History of Present Illness:     Resting comfortably this morning but still with chest pain; improved with Nitro drip overnight; remains on heparin drip; no SOB currently; no chills    Objective:   No intake or output data in the 24 hours ending 07/15/21 1016   Vitals:   Vitals:    07/15/21 0730   BP: 117/72   Pulse: 76   Resp: 16   Temp: 97.8 °F (36.6 °C)   SpO2: 97%     Physical Exam:   GEN Awake male, sitting upright in bed in no apparent distress. Appears given age. NECK Supple, no apparent thyromegaly or masses. RESP Clear to auscultation, no wheezes, rales or rhonchi. Symmetric chest movement while on room air. CARDIO/VASC S1/S2 auscultated. Regular rate without appreciable murmurs, rubs, or gallops. GI Abdomen is soft without significant tenderness, masses, or guarding   No costovertebral angle tenderness. HEME/LYMPH No petechiae or ecchymoses. MSK No gross joint deformities.   SKIN Normal coloration, warm, dry.  NEURO Cranial nerves appear grossly intact, normal speech  PSYCH Awake, alert, oriented x 4. Affect appropriate.     Medications:   Medications:    metoprolol tartrate  12.5 mg Oral BID    amLODIPine  10 mg Oral Daily    gabapentin  300 mg Oral TID    sertraline  50 mg Oral Nightly    sodium chloride flush  5-40 mL Intravenous 2 times per day    aspirin  81 mg Oral Daily    atorvastatin  40 mg Oral Nightly    traMADol  50 mg Oral Q12H    clopidogrel  75 mg Oral Daily      Infusions:    nitroGLYCERIN 5 mcg/min (07/15/21 0234)    sodium chloride      heparin (PORCINE) Infusion 15 Units/kg/hr (07/15/21 0809)     PRN Meds: morphine, 1 mg, Q1H PRN  sodium chloride flush, 5-40 mL, PRN  sodium chloride, 25 mL, PRN  ondansetron, 4 mg, Q8H PRN   Or  ondansetron, 4 mg, Q6H PRN  polyethylene glycol, 17 g, Daily PRN  acetaminophen, 650 mg, Q6H PRN   Or  acetaminophen, 650 mg, Q6H PRN  heparin (porcine), 4,000 Units, PRN  heparin (porcine), 2,000 Units, PRN          Electronically signed by Nestor Covarrubias MD on 7/15/2021 at 10:16 AM

## 2021-07-15 NOTE — PLAN OF CARE
Problem: Pain:  Goal: Pain level will decrease  Description: Pain level will decrease  Outcome: Ongoing  Goal: Control of acute pain  Description: Control of acute pain  Outcome: Ongoing  Goal: Control of chronic pain  Description: Control of chronic pain  Outcome: Ongoing     Problem:  Activity:  Goal: Risk for activity intolerance will decrease  Description: Risk for activity intolerance will decrease  Outcome: Ongoing     Problem: Cardiac:  Goal: Ability to maintain an adequate cardiac output will improve  Description: Ability to maintain an adequate cardiac output will improve  Outcome: Ongoing  Goal: Hemodynamic stability will improve  Description: Hemodynamic stability will improve  Outcome: Ongoing  Goal: Diagnostic test results will improve  Description: Diagnostic test results will improve  Outcome: Ongoing     Problem: Coping:  Goal: Ability to verbalize feelings about condition will improve  Description: Ability to verbalize feelings about condition will improve  Outcome: Ongoing     Problem: Health Behavior:  Goal: Ability to identify changes in lifestyle to reduce recurrence of condition will improve  Description: Ability to identify changes in lifestyle to reduce recurrence of condition will improve  Outcome: Ongoing  Goal: Ability to manage health-related needs will improve  Description: Ability to manage health-related needs will improve  Outcome: Ongoing     Problem: Sensory:  Goal: Pain level will decrease  Description: Pain level will decrease  Outcome: Ongoing

## 2021-07-15 NOTE — H&P
History and Physical      Name:  Marva Byrd /Age/Sex: 1965  (49 y.o. male)   MRN & CSN:  [id number] & [id number] Admission Date/Time: 2021  6:25 PM   Location:  [facility] PCP: Adan Rush MD       Hospital Day: 1    Assessment and Plan:   Marva Byrd is a 49 y.o.  male  with history of HTN who presents with chest pain. NSTEMI  - presented with intermittent chest pressure, lasting 10-15 min at a time; associated with nausea, diaphoresis, SOB and lightheadedness; complaining of 6/10 pain on admit  - initial trop T 0.084-->0.238  - initial EKG with no acute ischemia, repeat with concern for ST elevation in inferior leads (reviewed by Dr. Kerrie Sue), resolved on third EKG   - CTA Chest with no acute process, severe atherosclerosis of the abdominal aorta   - HS 5  - monitor on tele   - given full dose ASA in ED, started ASA 81mg, Plavix load, statin, PRN nitro, heparin gtt. Consider nitro gtt if pain refractory. - cardiology to evaluate in AM      Hypertension   - continue home amlodipine     Chronic Pain   - continue home gabapentin and tramadol     Diet ADULT DIET; Regular; Low Fat/Low Chol/High Fiber/2 gm Na  Diet NPO   DVT Prophylaxis [] Lovenox, [x]  Heparin, [] SCDs, [] Ambulation   GI Prophylaxis [] PPI,  [] H2 Blocker,  [] Carafate,  [] Diet/Tube Feeds   Code Status Full Code   Disposition Patient requires continued admission due to ACS rule out   MDM      History of Present Illness:     Chief Complaint: Marva Byrd is a 49 y.o.  male  who presents with chest pain. Patient states that over the last three days, he has had intermittent chest pain. He describes it as a pressure/discomfort like something is sitting on his chest. The pain does not radiate. It is associated with SOB, diaphoresis, nausea. The pain sometimes comes on with exertion, sometimes at rest. The pain was improved with nitro. He is currently having 7/10 chest pain.       I discussed this case with the ED provider, Dr. Sunita Keith and Dr. Odette Barajas (on call cardiologist). I personally reviewed patient's records, personally reviewed EKG, vitals including pulse ox. Ten point ROS reviewed negative, unless as noted above    Objective:   No intake or output data in the 24 hours ending 07/14/21 2223   Vitals:   Vitals:    07/14/21 1919   BP:    Pulse: 64   Resp: 19   Temp:    SpO2: 96%     Physical Exam:   GEN Awake male, sitting upright in bed. Appears anxious. Appears given age. EYES Pupils are equally round. No scleral erythema, discharge, or conjunctivitis. HENT Mucous membranes are moist.  NECK Supple, no apparent thyromegaly or masses. RESP Clear to auscultation, no wheezes, rales or rhonchi. Respirations even and unlabored on RA. CARDIO/VASC   S1/S2 auscultated. Regular rate without appreciable murmurs, rubs, or gallops. Peripheral pulses equal bilaterally and palpable. No peripheral edema. GI Abdomen is soft without significant tenderness, masses, or guarding. Bowel sounds are normoactive.  No costovertebral angle tenderness. Underwood catheter is not present. MSK No gross joint deformities. SKIN Normal coloration, warm, dry. NEURO Cranial nerves appear grossly intact, normal speech, no lateralizing weakness. PSYCH Awake, alert, oriented x 4. Affect appropriate. Past Medical History:      Past Medical History:   Diagnosis Date    DDD (degenerative disc disease), thoracic     HNP (herniated nucleus pulposus with myelopathy), thoracic     Hypertension     MRSA (methicillin resistant staph aureus) culture positive     Osteoarthritis of thoracic spine     with myelopathy    Pseudomeningocele     postprocedural     PSHX:  has a past surgical history that includes back surgery; Femur Surgery (Left); and laminectomy. Allergies:    Allergies   Allergen Reactions    Fentanyl Nausea And Vomiting       FAM HX: negative for premature coronary artery disease    Soc HX:   Social History Socioeconomic History    Marital status: Single     Spouse name: None    Number of children: None    Years of education: None    Highest education level: None   Occupational History    None   Tobacco Use    Smoking status: Former Smoker    Smokeless tobacco: Former User   Substance and Sexual Activity    Alcohol use: Yes     Comment: rare    Drug use: Yes    Sexual activity: None   Other Topics Concern    None   Social History Narrative    None     Social Determinants of Health     Financial Resource Strain:     Difficulty of Paying Living Expenses:    Food Insecurity:     Worried About Running Out of Food in the Last Year:     [address] in the Last Year:    Transportation Needs:     Lack of Transportation (Medical):      Lack of Transportation (Non-Medical):    Physical Activity:     Days of Exercise per Week:     Minutes of Exercise per Session:    Stress:     Feeling of Stress :    Social Connections:     Frequency of Communication with Friends and Family:     Frequency of Social Gatherings with Friends and Family:     Attends Hinduism Services:     Active Member of Clubs or Organizations:     Attends Club or Organization Meetings:     Marital Status:    Intimate Partner Violence:     Fear of Current or Ex-Partner:     Emotionally Abused:     Physically Abused:     Sexually Abused:        Medications:   Medications:    [START ON 7/15/2021] amLODIPine  10 mg Oral Daily    gabapentin  300 mg Oral TID    sertraline  50 mg Oral Nightly    sodium chloride flush  5-40 mL Intravenous 2 times per day    [START ON 7/15/2021] enoxaparin  40 mg Subcutaneous Daily    [START ON 7/15/2021] aspirin  81 mg Oral Daily    atorvastatin  40 mg Oral Nightly    traMADol  50 mg Oral Q12H      Infusions:    sodium chloride       PRN Meds: nitroGLYCERIN, 0.4 mg, Q5 Min PRN  sodium chloride flush, 5-40 mL, PRN  sodium chloride, 25 mL, PRN  ondansetron, 4 mg, Q8H PRN   Or  ondansetron, 4 mg, Q6H PRN  polyethylene glycol, 17 g, Daily PRN  acetaminophen, 650 mg, Q6H PRN   Or  acetaminophen, 650 mg, Q6H PRN          Electronically signed by Amie Torre PA-C on 7/14/2021 at 10:23 PM

## 2021-07-16 ENCOUNTER — TELEPHONE (OUTPATIENT)
Dept: CARDIOLOGY CLINIC | Age: 56
End: 2021-07-16

## 2021-07-16 VITALS
SYSTOLIC BLOOD PRESSURE: 142 MMHG | TEMPERATURE: 98.3 F | DIASTOLIC BLOOD PRESSURE: 99 MMHG | HEIGHT: 69 IN | HEART RATE: 79 BPM | WEIGHT: 197.13 LBS | BODY MASS INDEX: 29.2 KG/M2 | OXYGEN SATURATION: 96 % | RESPIRATION RATE: 20 BRPM

## 2021-07-16 PROBLEM — R07.9 CHEST PAIN: Status: RESOLVED | Noted: 2021-07-14 | Resolved: 2021-07-16

## 2021-07-16 LAB
APTT: 29.8 SECONDS (ref 25.1–37.1)
APTT: 31.9 SECONDS (ref 25.1–37.1)
HCT VFR BLD CALC: 39.1 % (ref 42–52)
HEMOGLOBIN: 12.4 GM/DL (ref 13.5–18)
LV EF: 53 %
LVEF MODALITY: NORMAL
MCH RBC QN AUTO: 30.4 PG (ref 27–31)
MCHC RBC AUTO-ENTMCNC: 31.7 % (ref 32–36)
MCV RBC AUTO: 95.8 FL (ref 78–100)
PDW BLD-RTO: 12.9 % (ref 11.7–14.9)
PLATELET # BLD: 216 K/CU MM (ref 140–440)
PMV BLD AUTO: 8.5 FL (ref 7.5–11.1)
RBC # BLD: 4.08 M/CU MM (ref 4.6–6.2)
WBC # BLD: 10.1 K/CU MM (ref 4–10.5)

## 2021-07-16 PROCEDURE — 6370000000 HC RX 637 (ALT 250 FOR IP): Performed by: INTERNAL MEDICINE

## 2021-07-16 PROCEDURE — 93306 TTE W/DOPPLER COMPLETE: CPT

## 2021-07-16 PROCEDURE — 36415 COLL VENOUS BLD VENIPUNCTURE: CPT

## 2021-07-16 PROCEDURE — 85027 COMPLETE CBC AUTOMATED: CPT

## 2021-07-16 PROCEDURE — 99233 SBSQ HOSP IP/OBS HIGH 50: CPT | Performed by: INTERNAL MEDICINE

## 2021-07-16 PROCEDURE — 85730 THROMBOPLASTIN TIME PARTIAL: CPT

## 2021-07-16 PROCEDURE — 2580000003 HC RX 258: Performed by: INTERNAL MEDICINE

## 2021-07-16 PROCEDURE — 6360000002 HC RX W HCPCS: Performed by: INTERNAL MEDICINE

## 2021-07-16 PROCEDURE — APPSS60 APP SPLIT SHARED TIME 46-60 MINUTES: Performed by: NURSE PRACTITIONER

## 2021-07-16 RX ORDER — ASPIRIN 81 MG/1
81 TABLET ORAL DAILY
Qty: 30 TABLET | Refills: 5 | Status: SHIPPED | OUTPATIENT
Start: 2021-07-16 | End: 2021-09-23 | Stop reason: ALTCHOICE

## 2021-07-16 RX ORDER — ATORVASTATIN CALCIUM 80 MG/1
80 TABLET, FILM COATED ORAL NIGHTLY
Qty: 90 TABLET | Refills: 3 | Status: SHIPPED | OUTPATIENT
Start: 2021-07-16

## 2021-07-16 RX ORDER — CLOPIDOGREL BISULFATE 75 MG/1
75 TABLET ORAL DAILY
Qty: 90 TABLET | Refills: 3 | Status: SHIPPED | OUTPATIENT
Start: 2021-07-17

## 2021-07-16 RX ADMIN — CLOPIDOGREL BISULFATE 75 MG: 75 TABLET ORAL at 09:01

## 2021-07-16 RX ADMIN — TRAMADOL HYDROCHLORIDE 50 MG: 50 TABLET, FILM COATED ORAL at 11:01

## 2021-07-16 RX ADMIN — AMLODIPINE BESYLATE 10 MG: 10 TABLET ORAL at 09:01

## 2021-07-16 RX ADMIN — METOPROLOL TARTRATE 12.5 MG: 25 TABLET, FILM COATED ORAL at 09:02

## 2021-07-16 RX ADMIN — GABAPENTIN 300 MG: 300 CAPSULE ORAL at 09:02

## 2021-07-16 RX ADMIN — ASPIRIN 81 MG: 81 TABLET, CHEWABLE ORAL at 09:01

## 2021-07-16 RX ADMIN — SODIUM CHLORIDE: 9 INJECTION, SOLUTION INTRAVENOUS at 04:44

## 2021-07-16 RX ADMIN — Medication 10 ML: at 09:02

## 2021-07-16 RX ADMIN — MORPHINE SULFATE 1 MG: 4 INJECTION, SOLUTION INTRAMUSCULAR; INTRAVENOUS at 09:02

## 2021-07-16 RX ADMIN — MORPHINE SULFATE 1 MG: 4 INJECTION, SOLUTION INTRAMUSCULAR; INTRAVENOUS at 01:23

## 2021-07-16 ASSESSMENT — PAIN DESCRIPTION - PAIN TYPE
TYPE: ACUTE PAIN
TYPE: ACUTE PAIN

## 2021-07-16 ASSESSMENT — PAIN DESCRIPTION - DESCRIPTORS
DESCRIPTORS: ACHING
DESCRIPTORS: ACHING;DISCOMFORT

## 2021-07-16 ASSESSMENT — PAIN DESCRIPTION - LOCATION
LOCATION: ARM
LOCATION: ARM;CHEST

## 2021-07-16 ASSESSMENT — PAIN SCALES - GENERAL
PAINLEVEL_OUTOF10: 6
PAINLEVEL_OUTOF10: 6
PAINLEVEL_OUTOF10: 0
PAINLEVEL_OUTOF10: 0
PAINLEVEL_OUTOF10: 6

## 2021-07-16 ASSESSMENT — ENCOUNTER SYMPTOMS: SHORTNESS OF BREATH: 0

## 2021-07-16 NOTE — DISCHARGE SUMMARY
Discharge Summary           Name:  Sarah Delatorre /Age/Sex: 1965  (49 y.o. male)   MRN & CSN:  [id number] & [id number] Admission Date/Time: 2021  6:25 PM   Attending:  Gregoria Leyva MD Discharging Physician: Gregoria Leyva MD     Hospital Course:   50 y/o M that presented with chest pain found to have an NSTEMI. Patient was seen by Cardiology, started on a heparin drip and loaded with plavix, given high dose ASA and high dose statin. Patient was taken to Cath lab and his RCA was stented. Patient had no complications after the cath and did very well. Patient was discharged on ASA/Plavix/High dose Statin, and Metoprolol. Patient will have close f/u with Cardiology in 10 days. Patient was discharged in stable condition. NSTEMI  - s/p stent to RCA  - Given scripts (as patient has all meds filled at his [place]) for Aspirin, Plavix, Atorvastatin, and Metoprolol  - f/u Cardiology in 10 days     Hypertension   - Continue home amlodipine      Chronic Pain   - Continue home gabapentin and tramadol     The patient expressed appropriate understanding of and agreement with the discharge recommendations, medications, and plan.      Consults this admission:  IP CONSULT TO CARDIOLOGY  IP CONSULT TO HOSPITALIST  IP CONSULT TO CARDIAC REHAB    Discharge Instruction:   Follow up appointments: Cardiology  Primary care physician:  within 2 weeks    Diet:  cardiac diet   Activity: activity as tolerated but no heavy lifting, strenuous activity until seen by Cardiology at follow-up  Disposition: Discharged to:   [x]Home, [][facility], []SNF, []Acute Rehab, []Hospice   Condition on discharge: Stable    Discharge Medications:      Josselyn Sprain   Home Medication Instructions [id number]    Printed on:21 [id number]   Medication Information                      amLODIPine (NORVASC) 10 MG tablet  Take 10 mg by mouth daily             aspirin EC 81 MG EC tablet  Take 1 tablet by mouth daily             atorvastatin (LIPITOR) 80 MG tablet  Take 1 tablet by mouth nightly             clopidogrel (PLAVIX) 75 MG tablet  Take 1 tablet by mouth daily             gabapentin (NEURONTIN) 300 MG capsule  Take 300 mg by mouth 3 times daily             hydrALAZINE (APRESOLINE) 25 MG tablet  Take 25 mg by mouth 2 times daily Indications: High Blood Pressure causing Heart Disease             metoprolol tartrate (LOPRESSOR) 25 MG tablet  Take 0.5 tablets by mouth 2 times daily             sertraline (ZOLOFT) 50 MG tablet  Take 50 mg by mouth daily             traMADol (ULTRAM) 50 MG tablet  Take 50 mg by mouth every 12 hours. Objective Findings at Discharge:   BP (!) 142/99   Pulse 79   Temp 98.3 °F (36.8 °C) (Oral)   Resp 20   Ht 5' 9\" (1.753 m)   Wt 197 lb 2 oz (89.4 kg)   SpO2 96%   BMI 29.11 kg/m²            GEN    Awake male, sitting upright in bed in no apparent distress. Appears given age. NECK  Supple, no apparent thyromegaly or masses. RESP  Clear to auscultation, no wheezes, rales or rhonchi. Symmetric chest movement while on room air. CARDIO/VASC           S1/S2 auscultated. Regular rate without appreciable murmurs, rubs, or gallops. GI        Abdomen is soft without significant tenderness, masses, or guarding         No costovertebral angle tenderness. HEME/LYMPH            No petechiae or ecchymoses. MSK    No gross joint deformities. SKIN    Normal coloration, warm, dry, cath site c/d/i with no hematoma  NEURO           Cranial nerves appear grossly intact, normal speech  PSYCH            Awake, alert, oriented x 4. Affect appropriate. BMP/CBC  Recent Labs     07/14/21  1706 07/14/21  2330 07/16/21  0312     --   --    K 3.6  --   --    CL 98*  --   --    CO2 26  --   --    BUN 16  --   --    CREATININE 1.2  --   --    WBC 10.4 8.5 10.1   HCT 42.2 39.4* 39.1*    233 216       IMAGING:  CTA Chest:  1. No acute aortic injury or dissection involving either the thoracic or   abdominal aorta.

## 2021-07-16 NOTE — PROGRESS NOTES
Cardiology Progress Note     Today's Plan:Okay for discharge     Admit Date:  7/14/2021    Consult reason/ Seen today for: NSTEMI     Subjective and  Overnight Events: Patient denies any chest pain or shortness of breath. Right radial site is free of hematoma, no bleeding, fingers pink, no drainage, mobility intact, pulses palpable, patient denies any pain, wound is healing well. Assessment / Plan / Recommendation:     1. NSTEMI: elevated troponin-LHC yesterday with PCI of RCA. Echo today. Continue with aspirin, Plavix, Lipitor, and Lopressor. DAPT for 1 year. 2. HTN:controlled, continue Norvasc 10 mg daily, Lopressor 12.5 mg twice daily, can titrate accordingly   3. Dyslipidemia: continue statins  4. H/O CVA, continue with Plavix and statins. 5. DVT prophylaxis if not contraindicated. 6. Patient is okay from discharge from cardiac standpoint. F/U in office in 10 days with Dr. Tiffany Abrams       History of Presenting Illness:    Chief complain on admission : 49 y. o.year old who is admitted for  Chief Complaint   Patient presents with    Chest Pain     shortness of breath, radiating into back        Past medical history:    has a past medical history of DDD (degenerative disc disease), thoracic, HNP (herniated nucleus pulposus with myelopathy), thoracic, Hypertension, MRSA (methicillin resistant staph aureus) culture positive, Osteoarthritis of thoracic spine, and Pseudomeningocele. Past surgical history:   has a past surgical history that includes back surgery; Femur Surgery (Left); and laminectomy. Social History:   reports that he has never smoked. He has quit using smokeless tobacco.  His smokeless tobacco use included chew. He reports current drug use. Drug: Marijuana. Family history:  family history is not on file.     Allergies   Allergen Reactions    Fentanyl Nausea And Vomiting       Review of Systems:  Review of Systems   Respiratory: Negative for shortness of breath. Cardiovascular: Negative for chest pain, palpitations and leg swelling. Musculoskeletal: Negative. Skin: Negative. Neurological: Negative for dizziness and weakness. All other systems reviewed and are negative. BP (!) 142/99   Pulse 79   Temp 98.3 °F (36.8 °C) (Oral)   Resp 20   Ht 5' 9\" (1.753 m)   Wt 197 lb 2 oz (89.4 kg)   SpO2 96%   BMI 29.11 kg/m²       Intake/Output Summary (Last 24 hours) at 7/16/2021 1015  Last data filed at 7/16/2021 0444  Gross per 24 hour   Intake 775 ml   Output 650 ml   Net 125 ml       Physical Exam:  Constitutional:  Well developed, Well nourished, No acute distress  HENT:  Normocephalic, Atraumatic, Bilateral external ears normal,  Nose normal.   Neck- trachea is midline  Eyes:  PERRL, Conjunctiva normal  Respiratory:  Normal breath sounds, No respiratory distress, No wheezing, No chest tenderness. Cardiovascular:  Normal heart rate, Normal rhythm, no murmurs appreciated, No rubs appreciated, No gallops appreciated, JVP not elevated  Abdomen/GI:  Soft, No tenderness  Musculoskeletal:  Intact distal pulses, no edema, No tenderness, No cyanosis, No clubbing. Right radial site is free of hematoma, no bleeding, fingers pink, no drainage, mobility intact, pulses palpable, patient denies any pain, wound is healing well. Integument:  Warm, Dry  Lymphatic:  No lymphadenopathy noted.    Neurologic:  Alert & oriented  Psychiatric:  Affect and Mood :pleasant     Medications:    metoprolol tartrate  12.5 mg Oral BID    atorvastatin  80 mg Oral Nightly    sodium chloride flush  5-40 mL Intravenous 2 times per day    aspirin  81 mg Oral Daily    clopidogrel  75 mg Oral Daily    amLODIPine  10 mg Oral Daily    gabapentin  300 mg Oral TID    sertraline  50 mg Oral Nightly    sodium chloride flush  5-40 mL Intravenous 2 times per day    traMADol  50 mg Oral Q12H      sodium chloride 75 mL/hr at 07/16/21 0444    sodium chloride      sodium chloride       morphine, sodium chloride flush, sodium chloride, acetaminophen, sodium chloride flush, sodium chloride, ondansetron **OR** ondansetron, polyethylene glycol, acetaminophen **OR** acetaminophen    Lab Data:  CBC:   Recent Labs     07/14/21 1706 07/14/21  2330 07/16/21  0312   WBC 10.4 8.5 10.1   HGB 14.1 13.6 12.4*   HCT 42.2 39.4* 39.1*   MCV 92.3 92.7 95.8    233 216     BMP:   Recent Labs     07/14/21 1706      K 3.6   CL 98*   CO2 26   BUN 16   CREATININE 1.2     PT/INR: No results for input(s): PROTIME, INR in the last 72 hours. BNP:    Recent Labs     07/14/21 1706   PROBNP 123.8     TROPONIN:   Recent Labs     07/14/21 1706 07/14/21  2138 07/15/21  0024   TROPONINT 0.084* 0.238* 0.415*          Impression:  Active Problems:    * No active hospital problems. *  Resolved Problems:    Chest pain       All labs, medications and tests reviewed by myself, continue all other medications of all above medical condition listed as is except for changes mentioned above. Thank you   Please call with questions. Electronically signed by Emmett Gonzales. DIYA Germain CNP on 7/16/2021 at 10:15 AM           CARDIOLOGY ATTENDING ADDENDUM    I have seen, spoken to and examined this patient personally, independently of the nurse practitioner. I have reviewed the hospital care given to date and reviewed all pertinent labs and imaging. The plan was developed mutually at the time of the visit with the patient,  NP   and myself. I have spoken with patient, nursing staff and provided written and verbal instructions . The above note has been reviewed and I agree with the assessment, diagnosis, and treatment plan with changes made by me as follows       HPI:  I have reviewed the above HPI  And agree with above   Please review addendum/changes made to note above     Interval history:      Physical Exam:  General:  Awake, alert, NAD  Head:normal  Eye:normal  Neck:  No

## 2021-07-16 NOTE — TELEPHONE ENCOUNTER
Called patient to schedule a 2 week f/u with Dr. Cecille Rangel for F/u [facility]. Patient had PCI of RCA on 7/15/2021. There was no answer left message asking patient to call office to schedule.

## 2021-07-17 LAB
EKG ATRIAL RATE: 73 BPM
EKG DIAGNOSIS: NORMAL
EKG P AXIS: 62 DEGREES
EKG P-R INTERVAL: 170 MS
EKG Q-T INTERVAL: 400 MS
EKG QRS DURATION: 84 MS
EKG QTC CALCULATION (BAZETT): 440 MS
EKG R AXIS: -7 DEGREES
EKG T AXIS: 12 DEGREES
EKG VENTRICULAR RATE: 73 BPM

## 2021-07-17 PROCEDURE — 93010 ELECTROCARDIOGRAM REPORT: CPT | Performed by: INTERNAL MEDICINE

## 2021-07-30 ENCOUNTER — APPOINTMENT (OUTPATIENT)
Dept: CT IMAGING | Age: 56
End: 2021-07-30
Payer: OTHER GOVERNMENT

## 2021-07-30 ENCOUNTER — HOSPITAL ENCOUNTER (OUTPATIENT)
Age: 56
Setting detail: OBSERVATION
Discharge: HOME | End: 2021-08-01
Attending: EMERGENCY MEDICINE | Admitting: INTERNAL MEDICINE
Payer: OTHER GOVERNMENT

## 2021-07-30 ENCOUNTER — APPOINTMENT (OUTPATIENT)
Dept: GENERAL RADIOLOGY | Age: 56
End: 2021-07-30
Payer: OTHER GOVERNMENT

## 2021-07-30 ENCOUNTER — APPOINTMENT (OUTPATIENT)
Dept: ULTRASOUND IMAGING | Age: 56
End: 2021-07-30
Payer: OTHER GOVERNMENT

## 2021-07-30 DIAGNOSIS — R07.9 CHEST PAIN, UNSPECIFIED TYPE: Primary | ICD-10-CM

## 2021-07-30 DIAGNOSIS — I26.99 OTHER ACUTE PULMONARY EMBOLISM, UNSPECIFIED WHETHER ACUTE COR PULMONALE PRESENT (HCC): ICD-10-CM

## 2021-07-30 PROBLEM — R07.2 PRECORDIAL CHEST PAIN: Status: ACTIVE | Noted: 2021-07-30

## 2021-07-30 LAB
ALBUMIN SERPL-MCNC: 4.5 GM/DL (ref 3.4–5)
ALP BLD-CCNC: 107 IU/L (ref 40–129)
ALT SERPL-CCNC: 21 U/L (ref 10–40)
ANION GAP SERPL CALCULATED.3IONS-SCNC: 9 MMOL/L (ref 4–16)
AST SERPL-CCNC: 23 IU/L (ref 15–37)
BASOPHILS ABSOLUTE: 0.1 K/CU MM
BASOPHILS RELATIVE PERCENT: 0.6 % (ref 0–1)
BILIRUB SERPL-MCNC: 1 MG/DL (ref 0–1)
BUN BLDV-MCNC: 11 MG/DL (ref 6–23)
CALCIUM SERPL-MCNC: 9 MG/DL (ref 8.3–10.6)
CHLORIDE BLD-SCNC: 101 MMOL/L (ref 99–110)
CO2: 27 MMOL/L (ref 21–32)
CREAT SERPL-MCNC: 1 MG/DL (ref 0.9–1.3)
DIFFERENTIAL TYPE: ABNORMAL
EKG ATRIAL RATE: 71 BPM
EKG DIAGNOSIS: NORMAL
EKG P AXIS: 53 DEGREES
EKG P-R INTERVAL: 170 MS
EKG Q-T INTERVAL: 398 MS
EKG QRS DURATION: 98 MS
EKG QTC CALCULATION (BAZETT): 432 MS
EKG R AXIS: -7 DEGREES
EKG T AXIS: -7 DEGREES
EKG VENTRICULAR RATE: 71 BPM
EOSINOPHILS ABSOLUTE: 0.2 K/CU MM
EOSINOPHILS RELATIVE PERCENT: 2.6 % (ref 0–3)
GFR AFRICAN AMERICAN: >60 ML/MIN/1.73M2
GFR NON-AFRICAN AMERICAN: >60 ML/MIN/1.73M2
GLUCOSE BLD-MCNC: 88 MG/DL (ref 70–99)
HCT VFR BLD CALC: 42.4 % (ref 42–52)
HEMOGLOBIN: 14.1 GM/DL (ref 13.5–18)
IMMATURE NEUTROPHIL %: 0.2 % (ref 0–0.43)
LYMPHOCYTES ABSOLUTE: 2.3 K/CU MM
LYMPHOCYTES RELATIVE PERCENT: 27.5 % (ref 24–44)
MCH RBC QN AUTO: 32 PG (ref 27–31)
MCHC RBC AUTO-ENTMCNC: 33.3 % (ref 32–36)
MCV RBC AUTO: 96.1 FL (ref 78–100)
MONOCYTES ABSOLUTE: 0.7 K/CU MM
MONOCYTES RELATIVE PERCENT: 7.9 % (ref 0–4)
NUCLEATED RBC %: 0 %
PDW BLD-RTO: 12.4 % (ref 11.7–14.9)
PLATELET # BLD: 280 K/CU MM (ref 140–440)
PMV BLD AUTO: 8.7 FL (ref 7.5–11.1)
POTASSIUM SERPL-SCNC: 4 MMOL/L (ref 3.5–5.1)
RBC # BLD: 4.41 M/CU MM (ref 4.6–6.2)
SEGMENTED NEUTROPHILS ABSOLUTE COUNT: 5 K/CU MM
SEGMENTED NEUTROPHILS RELATIVE PERCENT: 61.2 % (ref 36–66)
SODIUM BLD-SCNC: 137 MMOL/L (ref 135–145)
TOTAL IMMATURE NEUTOROPHIL: 0.02 K/CU MM
TOTAL NUCLEATED RBC: 0 K/CU MM
TOTAL PROTEIN: 7.8 GM/DL (ref 6.4–8.2)
TROPONIN T: <0.01 NG/ML
WBC # BLD: 8.2 K/CU MM (ref 4–10.5)

## 2021-07-30 PROCEDURE — 96376 TX/PRO/DX INJ SAME DRUG ADON: CPT

## 2021-07-30 PROCEDURE — 85379 FIBRIN DEGRADATION QUANT: CPT

## 2021-07-30 PROCEDURE — 93010 ELECTROCARDIOGRAM REPORT: CPT | Performed by: INTERNAL MEDICINE

## 2021-07-30 PROCEDURE — 6370000000 HC RX 637 (ALT 250 FOR IP): Performed by: EMERGENCY MEDICINE

## 2021-07-30 PROCEDURE — 2580000003 HC RX 258: Performed by: INTERNAL MEDICINE

## 2021-07-30 PROCEDURE — 71046 X-RAY EXAM CHEST 2 VIEWS: CPT

## 2021-07-30 PROCEDURE — 6360000004 HC RX CONTRAST MEDICATION: Performed by: EMERGENCY MEDICINE

## 2021-07-30 PROCEDURE — 80053 COMPREHEN METABOLIC PANEL: CPT

## 2021-07-30 PROCEDURE — 99220 PR INITIAL OBSERVATION CARE/DAY 70 MINUTES: CPT | Performed by: INTERNAL MEDICINE

## 2021-07-30 PROCEDURE — 85025 COMPLETE CBC W/AUTO DIFF WBC: CPT

## 2021-07-30 PROCEDURE — 6370000000 HC RX 637 (ALT 250 FOR IP): Performed by: INTERNAL MEDICINE

## 2021-07-30 PROCEDURE — 2580000003 HC RX 258: Performed by: EMERGENCY MEDICINE

## 2021-07-30 PROCEDURE — 96374 THER/PROPH/DIAG INJ IV PUSH: CPT

## 2021-07-30 PROCEDURE — G0378 HOSPITAL OBSERVATION PER HR: HCPCS

## 2021-07-30 PROCEDURE — 99284 EMERGENCY DEPT VISIT MOD MDM: CPT

## 2021-07-30 PROCEDURE — 84484 ASSAY OF TROPONIN QUANT: CPT

## 2021-07-30 PROCEDURE — 71275 CT ANGIOGRAPHY CHEST: CPT

## 2021-07-30 PROCEDURE — 36415 COLL VENOUS BLD VENIPUNCTURE: CPT

## 2021-07-30 PROCEDURE — 93005 ELECTROCARDIOGRAM TRACING: CPT | Performed by: EMERGENCY MEDICINE

## 2021-07-30 PROCEDURE — 93970 EXTREMITY STUDY: CPT

## 2021-07-30 PROCEDURE — 6360000002 HC RX W HCPCS: Performed by: EMERGENCY MEDICINE

## 2021-07-30 RX ORDER — SODIUM CHLORIDE 0.9 % (FLUSH) 0.9 %
10 SYRINGE (ML) INJECTION
Status: COMPLETED | OUTPATIENT
Start: 2021-07-30 | End: 2021-07-30

## 2021-07-30 RX ORDER — SODIUM CHLORIDE 0.9 % (FLUSH) 0.9 %
5-40 SYRINGE (ML) INJECTION PRN
Status: DISCONTINUED | OUTPATIENT
Start: 2021-07-30 | End: 2021-08-01 | Stop reason: HOSPADM

## 2021-07-30 RX ORDER — HYDRALAZINE HYDROCHLORIDE 25 MG/1
25 TABLET, FILM COATED ORAL 2 TIMES DAILY
Status: DISCONTINUED | OUTPATIENT
Start: 2021-07-31 | End: 2021-08-01 | Stop reason: HOSPADM

## 2021-07-30 RX ORDER — SODIUM CHLORIDE 9 MG/ML
25 INJECTION, SOLUTION INTRAVENOUS PRN
Status: DISCONTINUED | OUTPATIENT
Start: 2021-07-30 | End: 2021-08-01 | Stop reason: HOSPADM

## 2021-07-30 RX ORDER — ONDANSETRON 4 MG/1
4 TABLET, ORALLY DISINTEGRATING ORAL EVERY 8 HOURS PRN
Status: DISCONTINUED | OUTPATIENT
Start: 2021-07-30 | End: 2021-08-01 | Stop reason: HOSPADM

## 2021-07-30 RX ORDER — TRAMADOL HYDROCHLORIDE 50 MG/1
50 TABLET ORAL 2 TIMES DAILY
Status: DISCONTINUED | OUTPATIENT
Start: 2021-07-31 | End: 2021-07-31

## 2021-07-30 RX ORDER — POLYETHYLENE GLYCOL 3350 17 G/17G
17 POWDER, FOR SOLUTION ORAL DAILY PRN
Status: DISCONTINUED | OUTPATIENT
Start: 2021-07-30 | End: 2021-08-01 | Stop reason: HOSPADM

## 2021-07-30 RX ORDER — ATORVASTATIN CALCIUM 80 MG/1
80 TABLET, FILM COATED ORAL NIGHTLY
Status: DISCONTINUED | OUTPATIENT
Start: 2021-07-31 | End: 2021-08-01 | Stop reason: HOSPADM

## 2021-07-30 RX ORDER — AMLODIPINE BESYLATE 10 MG/1
10 TABLET ORAL DAILY
Status: DISCONTINUED | OUTPATIENT
Start: 2021-07-31 | End: 2021-08-01 | Stop reason: HOSPADM

## 2021-07-30 RX ORDER — MORPHINE SULFATE 4 MG/ML
4 INJECTION, SOLUTION INTRAMUSCULAR; INTRAVENOUS ONCE
Status: COMPLETED | OUTPATIENT
Start: 2021-07-30 | End: 2021-07-30

## 2021-07-30 RX ORDER — ASPIRIN 81 MG/1
81 TABLET ORAL DAILY
Status: DISCONTINUED | OUTPATIENT
Start: 2021-07-31 | End: 2021-08-01 | Stop reason: HOSPADM

## 2021-07-30 RX ORDER — ACETAMINOPHEN 650 MG/1
650 SUPPOSITORY RECTAL EVERY 6 HOURS PRN
Status: DISCONTINUED | OUTPATIENT
Start: 2021-07-30 | End: 2021-08-01 | Stop reason: HOSPADM

## 2021-07-30 RX ORDER — ONDANSETRON 2 MG/ML
4 INJECTION INTRAMUSCULAR; INTRAVENOUS EVERY 6 HOURS PRN
Status: DISCONTINUED | OUTPATIENT
Start: 2021-07-30 | End: 2021-08-01 | Stop reason: HOSPADM

## 2021-07-30 RX ORDER — ACETAMINOPHEN 325 MG/1
650 TABLET ORAL EVERY 6 HOURS PRN
Status: DISCONTINUED | OUTPATIENT
Start: 2021-07-30 | End: 2021-08-01 | Stop reason: HOSPADM

## 2021-07-30 RX ORDER — GABAPENTIN 300 MG/1
300 CAPSULE ORAL 3 TIMES DAILY
Status: DISCONTINUED | OUTPATIENT
Start: 2021-07-31 | End: 2021-08-01 | Stop reason: HOSPADM

## 2021-07-30 RX ORDER — CLOPIDOGREL BISULFATE 75 MG/1
75 TABLET ORAL DAILY
Status: DISCONTINUED | OUTPATIENT
Start: 2021-07-31 | End: 2021-08-01 | Stop reason: HOSPADM

## 2021-07-30 RX ORDER — SODIUM CHLORIDE 0.9 % (FLUSH) 0.9 %
5-40 SYRINGE (ML) INJECTION 2 TIMES DAILY
Status: DISCONTINUED | OUTPATIENT
Start: 2021-07-31 | End: 2021-08-01 | Stop reason: HOSPADM

## 2021-07-30 RX ADMIN — IOPAMIDOL 75 ML: 755 INJECTION, SOLUTION INTRAVENOUS at 18:17

## 2021-07-30 RX ADMIN — GABAPENTIN 300 MG: 300 CAPSULE ORAL at 23:23

## 2021-07-30 RX ADMIN — MORPHINE SULFATE 4 MG: 4 INJECTION, SOLUTION INTRAMUSCULAR; INTRAVENOUS at 19:57

## 2021-07-30 RX ADMIN — SODIUM CHLORIDE, PRESERVATIVE FREE 10 ML: 5 INJECTION INTRAVENOUS at 23:25

## 2021-07-30 RX ADMIN — TRAMADOL HYDROCHLORIDE 50 MG: 50 TABLET, FILM COATED ORAL at 23:23

## 2021-07-30 RX ADMIN — ATORVASTATIN CALCIUM 80 MG: 80 TABLET, FILM COATED ORAL at 23:23

## 2021-07-30 RX ADMIN — SODIUM CHLORIDE, PRESERVATIVE FREE 10 ML: 5 INJECTION INTRAVENOUS at 18:18

## 2021-07-30 RX ADMIN — APIXABAN 10 MG: 5 TABLET, FILM COATED ORAL at 20:21

## 2021-07-30 RX ADMIN — METOPROLOL TARTRATE 12.5 MG: 25 TABLET, FILM COATED ORAL at 23:23

## 2021-07-30 RX ADMIN — MORPHINE SULFATE 4 MG: 4 INJECTION, SOLUTION INTRAMUSCULAR; INTRAVENOUS at 17:41

## 2021-07-30 ASSESSMENT — PAIN DESCRIPTION - LOCATION
LOCATION: CHEST

## 2021-07-30 ASSESSMENT — PAIN DESCRIPTION - FREQUENCY
FREQUENCY: CONTINUOUS
FREQUENCY: CONTINUOUS

## 2021-07-30 ASSESSMENT — PAIN DESCRIPTION - PAIN TYPE
TYPE: ACUTE PAIN

## 2021-07-30 ASSESSMENT — PAIN DESCRIPTION - ORIENTATION
ORIENTATION: LEFT

## 2021-07-30 ASSESSMENT — PAIN SCALES - GENERAL
PAINLEVEL_OUTOF10: 7
PAINLEVEL_OUTOF10: 6
PAINLEVEL_OUTOF10: 7

## 2021-07-30 ASSESSMENT — PAIN DESCRIPTION - ONSET: ONSET: ON-GOING

## 2021-07-30 ASSESSMENT — PAIN DESCRIPTION - PROGRESSION
CLINICAL_PROGRESSION: NOT CHANGED
CLINICAL_PROGRESSION: NOT CHANGED

## 2021-07-30 ASSESSMENT — PAIN DESCRIPTION - DESCRIPTORS
DESCRIPTORS: SHARP;DISCOMFORT
DESCRIPTORS: SHARP;STABBING

## 2021-07-30 NOTE — ED NOTES
Patient presents to Ed with complaints of mid chest pain, and left sided chest pain, described as sharp and stabbing. Reports that he was here and had a stent placed two weeks ago. Reports has been having his pain the last couple days.       Boby Reaves, RN  07/30/21 7081

## 2021-07-30 NOTE — ED NOTES
Pt recent heart cath- 2 weeks ago- same pain developing intermittent. Mid sternal to left chest. Pt lives home with wife.       Neville Cottrell, RN  07/30/21 7235

## 2021-07-30 NOTE — ED PROVIDER NOTES
Triage Chief Complaint:   Chest Pain (chest pain, reports stent placed 2 weeks ago. Chest pain last couple days. )      [place]:  Donna Kapoor is a 49 y.o. male that presents the emergency department stating he has had intermittent left-sided chest pain that feels like a pressure. Had a stent placed 2 weeks ago. States he has not gotten diaphoretic or sweaty but these are the same symptoms he was having prior to his stent being placed. States he did not come sooner because his daughter was getting . He denies any leg edema. States pain is a 7 out of 10. States he is on Plavix, statin, aspirin. Does not take nitro at home. . Per chart review patient had a heart cath with Dr. Spenser Cerda that showed 99% stenosis of the RCA that was stented. He did have disease noted to the LAD and circumflex. He was started on aspirin, Plavix, Lipitor, Lopressor.     Past Medical History:   Diagnosis Date    DDD (degenerative disc disease), thoracic     HNP (herniated nucleus pulposus with myelopathy), thoracic     Hypertension     MRSA (methicillin resistant staph aureus) culture positive     Osteoarthritis of thoracic spine     with myelopathy    Pseudomeningocele     postprocedural     Past Surgical History:   Procedure Laterality Date    BACK SURGERY      FEMUR SURGERY Left     LAMINECTOMY       Family History   Problem Relation Age of Onset    Coronary Art Dis Neg Hx      Social History     Socioeconomic History    Marital status: Single     Spouse name: Not on file    Number of children: Not on file    Years of education: Not on file    Highest education level: Not on file   Occupational History    Not on file   Tobacco Use    Smoking status: Never Smoker    Smokeless tobacco: Former User     Types: Chew   Substance and Sexual Activity    Alcohol use: Not on file     Comment: rare    Drug use: Yes     Types: Marijuana    Sexual activity: Not on file   Other Topics Concern    Not on file   Social History Narrative    Not on file     Social Determinants of Health     Financial Resource Strain: Low Risk     Difficulty of Paying Living Expenses: Not hard at all   Food Insecurity: No Food Insecurity    Worried About Running Out of Food in the Last Year: Never true    [address] in the Last Year: Never true   Transportation Needs: No Transportation Needs    Lack of Transportation (Medical): No    Lack of Transportation (Non-Medical): No   Physical Activity: Insufficiently Active    Days of Exercise per Week: 3 days    Minutes of Exercise per Session: 30 min   Stress: Stress Concern Present    Feeling of Stress :  To some extent   Social Connections: Socially Integrated    Frequency of Communication with Friends and Family: More than three times a week    Frequency of Social Gatherings with Friends and Family: Once a week    Attends Spiritism Services: More than 4 times per year    Active Member of "[employer]" Group or Organizations: No    Attends Club or Organization Meetings: 1 to 4 times per year    Marital Status:    Intimate Partner Violence: Not At Risk    Fear of Current or Ex-Partner: No    Emotionally Abused: No    Physically Abused: No    Sexually Abused: No     Current Facility-Administered Medications   Medication Dose Route Frequency Provider Last Rate Last Admin    apixaban (ELIQUIS) tablet 10 mg  10 mg Oral Once Danish Patel MD         Current Outpatient Medications   Medication Sig Dispense Refill    aspirin EC 81 MG EC tablet Take 1 tablet by mouth daily 30 tablet 5    atorvastatin (LIPITOR) 80 MG tablet Take 1 tablet by mouth nightly 90 tablet 3    metoprolol tartrate (LOPRESSOR) 25 MG tablet Take 0.5 tablets by mouth 2 times daily 120 tablet 3    clopidogrel (PLAVIX) 75 MG tablet Take 1 tablet by mouth daily 90 tablet 3    hydrALAZINE (APRESOLINE) 25 MG tablet Take 25 mg by mouth 2 times daily Indications: High Blood Pressure causing Heart Disease      traMADol (ULTRAM) 50 MG tablet Take 50 mg by mouth every 12 hours.  gabapentin (NEURONTIN) 300 MG capsule Take 300 mg by mouth 3 times daily      amLODIPine (NORVASC) 10 MG tablet Take 10 mg by mouth daily      sertraline (ZOLOFT) 50 MG tablet Take 50 mg by mouth daily       Allergies   Allergen Reactions    Fentanyl Nausea And Vomiting     Nursing Notes Reviewed    ROS:  At least 10 systems reviewed and otherwise negative except as in the [address] Physical Exam:  ED Triage Vitals   Enc Vitals Group      BP 07/30/21 1412 (!) 142/93      Pulse 07/30/21 1412 70      Resp 07/30/21 1412 19      Temp 07/30/21 1412 98.1 °F (36.7 °C)      Temp src --       SpO2 07/30/21 1412 100 %      Weight 07/30/21 1406 185 lb (83.9 kg)      Height 07/30/21 1406 5' 9\" (1.753 m)      Head Circumference --       Peak Flow --       Pain Score --       Pain Loc --       Pain Edu? --       Excl. in [address]? --      My pulse oximetry interpretation is which is within the normal range    GENERAL APPEARANCE: Awake and alert. Cooperative. No acute distress. HEAD: Normocephalic. Atraumatic. EYES: EOM's grossly intact. Sclera anicteric. ENT: Mucous membranes are moist. Tolerates saliva. No trismus. NECK: Supple. No meningismus. Trachea midline. HEART: RRR. Radial pulses 2+. LUNGS: Respirations unlabored. CTAB. No wheezing or stridor. No respiratory distress. ABDOMEN: Soft. Non-tender. No guarding or rebound. EXTREMITIES: No acute deformities. No edema. SKIN: Warm and dry. No diaphoresis. NEUROLOGICAL: No gross facial drooping. Moves all 4 extremities spontaneously. PSYCHIATRIC: Normal mood.     I have reviewed and interpreted all of the currently available lab results from this visit (if applicable):  Results for orders placed or performed during the hospital encounter of 07/30/21   CBC auto diff   Result Value Ref Range    WBC 8.2 4.0 - 10.5 K/CU MM    RBC 4.41 (L) 4.6 - 6.2 M/CU MM    Hemoglobin 14.1 13.5 - 18.0 GM/DL    Hematocrit 42.4 42 - 52 %    MCV 96.1 78 - 100 FL    MCH 32.0 (H) 27 - 31 PG    MCHC 33.3 32.0 - 36.0 %    RDW 12.4 11.7 - 14.9 %    Platelets 938 603 - 159 K/CU MM    MPV 8.7 7.5 - 11.1 FL    Differential Type AUTOMATED DIFFERENTIAL     Segs Relative 61.2 36 - 66 %    Lymphocytes % 27.5 24 - 44 %    Monocytes % 7.9 (H) 0 - 4 %    Eosinophils % 2.6 0 - 3 %    Basophils % 0.6 0 - 1 %    Segs Absolute 5.0 K/CU MM    Lymphocytes Absolute 2.3 K/CU MM    Monocytes Absolute 0.7 K/CU MM    Eosinophils Absolute 0.2 K/CU MM    Basophils Absolute 0.1 K/CU MM    Nucleated RBC % 0.0 %    Total Nucleated RBC 0.0 K/CU MM    Total Immature Neutrophil 0.02 K/CU MM    Immature Neutrophil % 0.2 0 - 0.43 %   CMP   Result Value Ref Range    Sodium 137 135 - 145 MMOL/L    Potassium 4.0 3.5 - 5.1 MMOL/L    Chloride 101 99 - 110 mMol/L    CO2 27 21 - 32 MMOL/L    BUN 11 6 - 23 MG/DL    CREATININE 1.0 0.9 - 1.3 MG/DL    Glucose 88 70 - 99 MG/DL    Calcium 9.0 8.3 - 10.6 MG/DL    Albumin 4.5 3.4 - 5.0 GM/DL    Total Protein 7.8 6.4 - 8.2 GM/DL    Total Bilirubin 1.0 0.0 - 1.0 MG/DL    ALT 21 10 - 40 U/L    AST 23 15 - 37 IU/L    Alkaline Phosphatase 107 40 - 129 IU/L    GFR Non-African American >60 >60 mL/min/1.73m2    GFR African American >60 >60 mL/min/1.73m2    Anion Gap 9 4 - 16   Troponin   Result Value Ref Range    Troponin T <0.010 <0.01 NG/ML   EKG 12 Lead   Result Value Ref Range    Ventricular Rate 71 BPM    Atrial Rate 71 BPM    P-R Interval 170 ms    QRS Duration 98 ms    Q-T Interval 398 ms    QTc Calculation (Bazett) 432 ms    P Axis 53 degrees    R Axis -7 degrees    T Axis -7 degrees    Diagnosis       Normal sinus rhythm  Normal ECG  When compared with ECG of 15-JUL-2021 11:58,  No significant change was found  Confirmed by Josette Lozoya MD, Jojo Byrd ([id number]) on 7/30/2021 5:31:09 PM     EKG 12 Lead   Result Value Ref Range    Ventricular Rate 62 BPM    Atrial Rate 62 BPM    P-R Interval 182 ms    QRS Duration 110 ms    Q-T Interval 426 ms QTc Calculation (Bazett) 432 ms    P Axis 51 degrees    R Axis 5 degrees    T Axis -5 degrees    Diagnosis       Normal sinus rhythm  Normal ECG  When compared with ECG of 30-JUL-2021 14:15,  No significant change was found          Radiographs:  [] Radiologist's Wet Read Report Reviewed:      XR CHEST (2 VW) (Final result)  Result time 07/30/21 14:40:47  Final result by Astrid Chisholm MD (07/30/21 14:40:47)                Impression:    1. No active pulmonary disease. Narrative:    EXAMINATION:   TWO XRAY VIEWS OF THE CHEST     7/30/2021 2:19 pm     COMPARISON:   07/14/2021. HISTORY:   ORDERING SYSTEM PROVIDED HISTORY: cp   TECHNOLOGIST PROVIDED HISTORY:   Reason for exam:->cp   Reason for Exam: chest pain   Acuity: Acute   Type of Exam: Initial   Additional signs and symptoms: na   Relevant Medical/Surgical History: hypertension     FINDINGS:   The heart size and pulmonary vasculature stable.  There are calcified lymph   nodes and granulomas.  No acute infiltrates are seen.  No pneumothoraces are   noted. CTA PULMONARY W CONTRAST (Final result)  Result time 07/30/21 19:38:38  Final result by Buddy Jones MD (07/30/21 19:38:38)                Impression:    Positive for pulmonary embolism in the right lower lobe.  No evidence of   right heart strain. Findings were discussed with REJI JIN at 7:35 pm on 7/30/2021. Narrative:    EXAMINATION:   CTA OF THE CHEST 7/30/2021 3:16 pm     TECHNIQUE:   CTA of the chest was performed after the administration of intravenous   contrast.  Multiplanar reformatted images are provided for review.  MIP   images are provided for review. Dose modulation, iterative reconstruction,   and/or weight based adjustment of the mA/kV was utilized to reduce the   radiation dose to as low as reasonably achievable.      COMPARISON:   07/14/2021     HISTORY:   ORDERING SYSTEM PROVIDED HISTORY: rule out pe   TECHNOLOGIST PROVIDED HISTORY:   Reason for exam:->rule out pe   Decision Support Exception - unselect if not a suspected or confirmed   emergency medical condition->Emergency Medical Condition (MA)   Reason for Exam: chest pain, recent stent placement   Acuity: Acute   Type of Exam: Initial   Additional signs and symptoms: none   Relevant Medical/Surgical History: 75ml isovue 370/ gfr>60     FINDINGS:   Pulmonary Arteries: The pulmonary arteries are adequately opacified.  There   are filling defects identified within the right lower lobe pulmonary arterial   branches. Kerry [place] are new when compared to the CTA from 07/14/2021.  No   filling defects are identified within the other lobes.  No evidence of right   heart strain is seen. Mediastinum: Visualized thyroid is unremarkable.  No mediastinal or hilar   lymphadenopathy.  Esophagus is unremarkable. There is cardiac chamber enlargement.  No pericardial effusion is identified. Thoracic aorta is normal in caliber without evidence of dissection. Lungs/pleura: Lungs are clear.  No pneumothorax.  No pleural effusion. Upper Abdomen: Unremarkable. Soft Tissues/Bones: No acute or suspicious bony abnormalities are identified. Visualized extra thoracic soft tissues are unremarkable.                     XR CHEST (2 VW) (Final result)  Result time 07/30/21 14:40:47  Final result by Cedric Capone MD (07/30/21 14:40:47)                Impression:    1. No active pulmonary disease. Narrative:    EXAMINATION:   TWO XRAY VIEWS OF THE CHEST     7/30/2021 2:19 pm     COMPARISON:   07/14/2021.      HISTORY:   ORDERING SYSTEM PROVIDED HISTORY: cp   TECHNOLOGIST PROVIDED HISTORY:   Reason for exam:->cp   Reason for Exam: chest pain   Acuity: Acute   Type of Exam: Initial   Additional signs and symptoms: na   Relevant Medical/Surgical History: hypertension     FINDINGS:   The heart size and pulmonary vasculature stable.  There are calcified lymph   nodes and granulomas.  No acute infiltrates are seen.  No pneumothoraces are   noted.                         [] Discussed with Radiologist:     [] The following radiograph was interpreted by myself in the absence of a radiologist:     EKG: (All EKG's are interpreted by myself in the absence of a cardiologist)  The Ekg interpreted by me shows  normal sinus rhythm with a rate of 71  Axis is   Left axis deviation  QTc is  normal  Intervals and Durations are unremarkable. ST Segments: T wave inversion in III  No significant change from prior EKG dated 7-    Repeat EKG: The Ekg interpreted by me shows  normal sinus rhythm with a rate of 62  Axis is   Normal  QTc is  normal  Intervals and Durations are unremarkable. ST Segments: T wave inversion in III  No significant change from prior EKG             MDM:  Patient normotensive, afebrile, heart rate in 70s, RR 19, sats normal.  EKG shows no acute findings. CBC within normal limits. CMP normal.  Troponin within normal limits. Chest x-ray shows no acute findings. Given morphine for pain. EKG shows no acute findings. Discussed with Dr. Meri Alicia and did discuss admission for at least observation since he is having similar symptoms to when he previously had his stent placed. 2 EKGs are within normal limits. Troponin is currently normal.  Patient feels much improved after morphine. Discussed and agrees to admit. CTA chest shows RLL PE. Discussed results with patient, states he has been having some pain in his legs but no swelling. States he ignored it due to having chronic back pain and some intermittent neuropathy. We will add venous Dopplers to his lower extremities however will call the hospitalist for admit. Vitals are stable. Hospitalist has asked me to start him on Eliquis. Did order 10 mg tablet.     CRITICAL CARE NOTE:  There was a high probability of clinically significant life-threatening deterioration of the patient's condition requiring my urgent intervention due to chest pain, pulmonary embolism. Pain control, blood thinner, chart review, discussion, admit was performed to address this. Total critical care time is at least  35 minutes. This includes vital sign monitoring, pulse oximetry monitoring, telemetry monitoring, clinical response to the IV medications, reviewing the nursing notes, consultation time, dictation/documentation time, and interpretation of the lab work. This time excludes time spent performing procedures and separately billable procedures and family discussion time. Clinical Impression:  1. Chest pain, unspecified type    2. Other acute pulmonary embolism, unspecified whether acute cor pulmonale present (HCC)        Disposition Vitals:  [unfilled], [unfilled], [unfilled], [unfilled]    Disposition referral (if applicable):  No follow-up provider specified.     Disposition medications (if applicable):  New Prescriptions    No medications on file         (Please note that portions of this note may have been completed with a voice recognition program. Efforts were made to edit the dictations but occasionally words are mis-transcribed.)    MD Zeeshan Vogel MD  07/30/21 [facility] [address], MD  07/30/21 2012

## 2021-07-31 LAB
D DIMER: 262 NG/ML(DDU)
EKG ATRIAL RATE: 61 BPM
EKG ATRIAL RATE: 62 BPM
EKG DIAGNOSIS: NORMAL
EKG DIAGNOSIS: NORMAL
EKG P AXIS: 51 DEGREES
EKG P AXIS: 53 DEGREES
EKG P-R INTERVAL: 182 MS
EKG P-R INTERVAL: 190 MS
EKG Q-T INTERVAL: 420 MS
EKG Q-T INTERVAL: 426 MS
EKG QRS DURATION: 104 MS
EKG QRS DURATION: 110 MS
EKG QTC CALCULATION (BAZETT): 422 MS
EKG QTC CALCULATION (BAZETT): 432 MS
EKG R AXIS: 1 DEGREES
EKG R AXIS: 5 DEGREES
EKG T AXIS: -13 DEGREES
EKG T AXIS: -5 DEGREES
EKG VENTRICULAR RATE: 61 BPM
EKG VENTRICULAR RATE: 62 BPM
HCT VFR BLD CALC: 39.9 % (ref 42–52)
HEMOGLOBIN: 13.3 GM/DL (ref 13.5–18)
MCH RBC QN AUTO: 31.8 PG (ref 27–31)
MCHC RBC AUTO-ENTMCNC: 33.3 % (ref 32–36)
MCV RBC AUTO: 95.5 FL (ref 78–100)
PDW BLD-RTO: 12.5 % (ref 11.7–14.9)
PLATELET # BLD: 256 K/CU MM (ref 140–440)
PMV BLD AUTO: 9 FL (ref 7.5–11.1)
RBC # BLD: 4.18 M/CU MM (ref 4.6–6.2)
TROPONIN T: <0.01 NG/ML
TROPONIN T: <0.01 NG/ML
WBC # BLD: 9.6 K/CU MM (ref 4–10.5)

## 2021-07-31 PROCEDURE — 6370000000 HC RX 637 (ALT 250 FOR IP): Performed by: INTERNAL MEDICINE

## 2021-07-31 PROCEDURE — 6370000000 HC RX 637 (ALT 250 FOR IP): Performed by: NURSE PRACTITIONER

## 2021-07-31 PROCEDURE — 99214 OFFICE O/P EST MOD 30 MIN: CPT | Performed by: INTERNAL MEDICINE

## 2021-07-31 PROCEDURE — 93005 ELECTROCARDIOGRAM TRACING: CPT | Performed by: INTERNAL MEDICINE

## 2021-07-31 PROCEDURE — 85027 COMPLETE CBC AUTOMATED: CPT

## 2021-07-31 PROCEDURE — 84484 ASSAY OF TROPONIN QUANT: CPT

## 2021-07-31 PROCEDURE — 93010 ELECTROCARDIOGRAM REPORT: CPT | Performed by: INTERNAL MEDICINE

## 2021-07-31 PROCEDURE — G0378 HOSPITAL OBSERVATION PER HR: HCPCS

## 2021-07-31 PROCEDURE — 36415 COLL VENOUS BLD VENIPUNCTURE: CPT

## 2021-07-31 PROCEDURE — 6360000002 HC RX W HCPCS: Performed by: STUDENT IN AN ORGANIZED HEALTH CARE EDUCATION/TRAINING PROGRAM

## 2021-07-31 PROCEDURE — 2580000003 HC RX 258: Performed by: INTERNAL MEDICINE

## 2021-07-31 PROCEDURE — 96376 TX/PRO/DX INJ SAME DRUG ADON: CPT

## 2021-07-31 RX ORDER — OXYCODONE HYDROCHLORIDE AND ACETAMINOPHEN 5; 325 MG/1; MG/1
1 TABLET ORAL EVERY 4 HOURS PRN
Status: DISCONTINUED | OUTPATIENT
Start: 2021-07-31 | End: 2021-08-01 | Stop reason: HOSPADM

## 2021-07-31 RX ORDER — MORPHINE SULFATE 2 MG/ML
2 INJECTION, SOLUTION INTRAMUSCULAR; INTRAVENOUS EVERY 4 HOURS PRN
Status: COMPLETED | OUTPATIENT
Start: 2021-07-31 | End: 2021-07-31

## 2021-07-31 RX ADMIN — SODIUM CHLORIDE, PRESERVATIVE FREE 10 ML: 5 INJECTION INTRAVENOUS at 21:07

## 2021-07-31 RX ADMIN — SERTRALINE HYDROCHLORIDE 50 MG: 50 TABLET ORAL at 09:09

## 2021-07-31 RX ADMIN — SODIUM CHLORIDE, PRESERVATIVE FREE 10 ML: 5 INJECTION INTRAVENOUS at 09:05

## 2021-07-31 RX ADMIN — OXYCODONE HYDROCHLORIDE AND ACETAMINOPHEN 1 TABLET: 5; 325 TABLET ORAL at 21:10

## 2021-07-31 RX ADMIN — APIXABAN 10 MG: 5 TABLET, FILM COATED ORAL at 09:21

## 2021-07-31 RX ADMIN — APIXABAN 10 MG: 5 TABLET, FILM COATED ORAL at 21:05

## 2021-07-31 RX ADMIN — MORPHINE SULFATE 2 MG: 2 INJECTION, SOLUTION INTRAMUSCULAR; INTRAVENOUS at 05:44

## 2021-07-31 RX ADMIN — HYDRALAZINE HYDROCHLORIDE 25 MG: 25 TABLET, FILM COATED ORAL at 17:13

## 2021-07-31 RX ADMIN — SODIUM CHLORIDE, PRESERVATIVE FREE 10 ML: 5 INJECTION INTRAVENOUS at 09:10

## 2021-07-31 RX ADMIN — TRAMADOL HYDROCHLORIDE 50 MG: 50 TABLET, FILM COATED ORAL at 09:09

## 2021-07-31 RX ADMIN — ASPIRIN 81 MG: 81 TABLET, COATED ORAL at 09:09

## 2021-07-31 RX ADMIN — HYDRALAZINE HYDROCHLORIDE 25 MG: 25 TABLET, FILM COATED ORAL at 09:07

## 2021-07-31 RX ADMIN — OXYCODONE HYDROCHLORIDE AND ACETAMINOPHEN 1 TABLET: 5; 325 TABLET ORAL at 15:43

## 2021-07-31 RX ADMIN — GABAPENTIN 300 MG: 300 CAPSULE ORAL at 09:07

## 2021-07-31 RX ADMIN — GABAPENTIN 300 MG: 300 CAPSULE ORAL at 21:05

## 2021-07-31 RX ADMIN — OXYCODONE HYDROCHLORIDE AND ACETAMINOPHEN 1 TABLET: 5; 325 TABLET ORAL at 11:29

## 2021-07-31 RX ADMIN — METOPROLOL TARTRATE 12.5 MG: 25 TABLET, FILM COATED ORAL at 21:04

## 2021-07-31 RX ADMIN — ATORVASTATIN CALCIUM 80 MG: 80 TABLET, FILM COATED ORAL at 21:05

## 2021-07-31 RX ADMIN — AMLODIPINE BESYLATE 10 MG: 10 TABLET ORAL at 09:07

## 2021-07-31 RX ADMIN — GABAPENTIN 300 MG: 300 CAPSULE ORAL at 15:43

## 2021-07-31 RX ADMIN — METOPROLOL TARTRATE 12.5 MG: 25 TABLET, FILM COATED ORAL at 09:08

## 2021-07-31 RX ADMIN — CLOPIDOGREL BISULFATE 75 MG: 75 TABLET ORAL at 09:09

## 2021-07-31 RX ADMIN — MORPHINE SULFATE 2 MG: 2 INJECTION, SOLUTION INTRAMUSCULAR; INTRAVENOUS at 00:47

## 2021-07-31 ASSESSMENT — PAIN DESCRIPTION - PAIN TYPE
TYPE: ACUTE PAIN

## 2021-07-31 ASSESSMENT — PAIN DESCRIPTION - DESCRIPTORS
DESCRIPTORS: PRESSURE;DISCOMFORT
DESCRIPTORS: SHARP;DISCOMFORT
DESCRIPTORS: SHARP;DISCOMFORT

## 2021-07-31 ASSESSMENT — PAIN SCALES - GENERAL
PAINLEVEL_OUTOF10: 0
PAINLEVEL_OUTOF10: 8
PAINLEVEL_OUTOF10: 6
PAINLEVEL_OUTOF10: 8
PAINLEVEL_OUTOF10: 0
PAINLEVEL_OUTOF10: 0
PAINLEVEL_OUTOF10: 8
PAINLEVEL_OUTOF10: 8
PAINLEVEL_OUTOF10: 7

## 2021-07-31 ASSESSMENT — PAIN DESCRIPTION - ONSET
ONSET: ON-GOING

## 2021-07-31 ASSESSMENT — ENCOUNTER SYMPTOMS
NAUSEA: 0
SHORTNESS OF BREATH: 0
VOMITING: 0
COUGH: 0

## 2021-07-31 ASSESSMENT — PAIN DESCRIPTION - ORIENTATION
ORIENTATION: LEFT

## 2021-07-31 ASSESSMENT — PAIN DESCRIPTION - LOCATION
LOCATION: CHEST

## 2021-07-31 ASSESSMENT — PAIN DESCRIPTION - PROGRESSION
CLINICAL_PROGRESSION: GRADUALLY IMPROVING
CLINICAL_PROGRESSION: GRADUALLY WORSENING
CLINICAL_PROGRESSION: NOT CHANGED

## 2021-07-31 ASSESSMENT — PAIN DESCRIPTION - FREQUENCY
FREQUENCY: INTERMITTENT
FREQUENCY: CONTINUOUS
FREQUENCY: CONTINUOUS

## 2021-07-31 NOTE — ED NOTES
Report called to nurse Gaby Pérez on 2E.       Maira aHmmond, RN  07/30/21 [address], RN  07/30/21 9050

## 2021-07-31 NOTE — ED NOTES
Inpatient bed assignment changed for pt. Called 2E to verify appropriate nurse has received report. Transport requested.       Rajani Oconnor RN  07/30/21 1990

## 2021-07-31 NOTE — PROGRESS NOTES
Progress Note  Date:2021       Room:-A  Patient Name:Uday Forman     YOB: 1965     Age:56 y.o. Seen and examined in the room. Chest pain improved, no shortness of breath. Subjective    Subjective:  Symptoms:  Improved. He reports chest pain and chest pressure. No shortness of breath, malaise, cough, weakness, headache or anxiety. Diet:  Adequate intake. No nausea or vomiting. Activity level: Normal.    Pain:  He complains of pain that is mild. He reports pain is improving. Pain is well controlled. Review of Systems   Constitutional: Negative for fever. Respiratory: Negative for cough and shortness of breath. Cardiovascular: Positive for chest pain. Gastrointestinal: Negative for nausea and vomiting. Neurological: Negative for weakness. Objective         Vitals Last 24 Hours:  TEMPERATURE:  Temp  Av °F (36.7 °C)  Min: 97.8 °F (36.6 °C)  Max: 98.2 °F (36.8 °C)  RESPIRATIONS RANGE: Resp  Av.1  Min: 13  Max: 20  PULSE OXIMETRY RANGE: SpO2  Av.6 %  Min: 95 %  Max: 100 %  PULSE RANGE: Pulse  Av.9  Min: 63  Max: 71  BLOOD PRESSURE RANGE: Systolic (16JPH), SSI:318 , Min:113 , RLK:962   ; Diastolic (02MUP), PUL:60, Min:68, Max:93    I/O (24Hr): Intake/Output Summary (Last 24 hours) at 2021 1154  Last data filed at 2021 1101  Gross per 24 hour   Intake 0 ml   Output 200 ml   Net -200 ml     Objective:  General Appearance: In no acute distress. Vital signs: (most recent): Blood pressure 114/81, pulse 66, temperature 98 °F (36.7 °C), temperature source Oral, resp. rate 13, height 5' 9\" (1.753 m), weight 167 lb 9.6 oz (76 kg), SpO2 96 %. Vital signs are normal.  No fever. Output: Producing urine and producing stool. Lungs:  Normal effort and normal respiratory rate. Heart: Normal rate. Regular rhythm. Abdomen: Abdomen is soft. Bowel sounds are normal.   There is no abdominal tenderness.      Neurological: Patient is alert. Skin:  Warm. Labs/Imaging/Diagnostics    Labs:  CBC:  Recent Labs     07/30/21  1414 07/31/21  0515   WBC 8.2 9.6   RBC 4.41* 4.18*   HGB 14.1 13.3*   HCT 42.4 39.9*   MCV 96.1 95.5   RDW 12.4 12.5    256     CHEMISTRIES:  Recent Labs     07/30/21  1414      K 4.0      CO2 27   BUN 11   CREATININE 1.0   GLUCOSE 88     PT/INR:No results for input(s): PROTIME, INR in the last 72 hours. APTT:No results for input(s): APTT in the last 72 hours. LIVER PROFILE:  Recent Labs     07/30/21  1414   AST 23   ALT 21   BILITOT 1.0   ALKPHOS 107       Imaging Last 24 Hours:  XR CHEST (2 VW)    Result Date: 7/30/2021  EXAMINATION: TWO XRAY VIEWS OF THE CHEST 7/30/2021 2:19 pm COMPARISON: 07/14/2021. HISTORY: ORDERING SYSTEM PROVIDED HISTORY: cp TECHNOLOGIST PROVIDED HISTORY: Reason for exam:->cp Reason for Exam: chest pain Acuity: Acute Type of Exam: Initial Additional signs and symptoms: na Relevant Medical/Surgical History: hypertension FINDINGS: The heart size and pulmonary vasculature stable. There are calcified lymph nodes and granulomas. No acute infiltrates are seen. No pneumothoraces are noted. 1. No active pulmonary disease. VL DUP LOWER EXTREMITY VENOUS BILATERAL    Result Date: 7/30/2021  EXAMINATION: DUPLEX VENOUS ULTRASOUND OF THE BILATERAL LOWER EXTREMITIES, 7/30/2021 8:18 pm TECHNIQUE: Duplex ultrasound using B-mode/gray scaled imaging and Doppler spectral analysis and color flow was obtained of the bilateral lower extremities. COMPARISON: None. HISTORY: ORDERING SYSTEM PROVIDED HISTORY: + PE, states some pain in legs TECHNOLOGIST PROVIDED HISTORY: Reason for exam:->+ PE, states some pain in legs Reason for Exam: PE, pain Acuity: Acute Type of Exam: Initial FINDINGS: The visualized veins of the bilateral lower extremities are patent and free of echogenic thrombus. The veins demonstrate good compressibility with normal color flow study and spectral analysis. The ultrasound technologist has mentioned that the compression of left sided venous structure was easier than the right side. No evidence of DVT in either lower extremity. CTA PULMONARY W CONTRAST    Result Date: 7/30/2021  EXAMINATION: CTA OF THE CHEST 7/30/2021 3:16 pm TECHNIQUE: CTA of the chest was performed after the administration of intravenous contrast.  Multiplanar reformatted images are provided for review. MIP images are provided for review. Dose modulation, iterative reconstruction, and/or weight based adjustment of the mA/kV was utilized to reduce the radiation dose to as low as reasonably achievable. COMPARISON: 07/14/2021 HISTORY: ORDERING SYSTEM PROVIDED HISTORY: rule out pe TECHNOLOGIST PROVIDED HISTORY: Reason for exam:->rule out pe Decision Support Exception - unselect if not a suspected or confirmed emergency medical condition->Emergency Medical Condition (MA) Reason for Exam: chest pain, recent stent placement Acuity: Acute Type of Exam: Initial Additional signs and symptoms: none Relevant Medical/Surgical History: 75ml isovue 370/ gfr>60 FINDINGS: Pulmonary Arteries: The pulmonary arteries are adequately opacified. There are filling defects identified within the right lower lobe pulmonary arterial branches. These are new when compared to the CTA from 07/14/2021. No filling defects are identified within the other lobes. No evidence of right heart strain is seen. Mediastinum: Visualized thyroid is unremarkable. No mediastinal or hilar lymphadenopathy. Esophagus is unremarkable. There is cardiac chamber enlargement. No pericardial effusion is identified. Thoracic aorta is normal in caliber without evidence of dissection. Lungs/pleura: Lungs are clear. No pneumothorax. No pleural effusion. Upper Abdomen: Unremarkable. Soft Tissues/Bones: No acute or suspicious bony abnormalities are identified. Visualized extra thoracic soft tissues are unremarkable.      Positive for pulmonary embolism in the right lower lobe. No evidence of right heart strain. Findings were discussed with REJI JIN at 7:35 pm on 7/30/2021. Assessment//Plan           Hospital Problems         Last Modified POA    Precordial chest pain 7/30/2021 Yes        Assessment & Plan   22-year-old male recent history of LHC with stents presented with chest pain. #  Chest pain  Trop negative. EKG has no acute ST-T change. CTA showed right side pulmonary embolism. Cardiology consulted, appreciate help. Recommend continue DAPT, statin, and BB. Plan to discharge tomorrow if stable. #  Right-sided pulmonary embolism  CTA showed right side lower lobe pulmonary embolism. Started on Eliquis. CTA no right heart strain. #  Essential hypertension  BP controlled, continue home medication. #  Chronic low back pain due to DJD with radiculopathy  Continue pain medication. Code Status: full  DVT prophylaxis: eliquis. Patient seen and examined independent by me, attending did not see the patient, but was available for consultation.     Electronically signed by DIYA Villavicencio CNP on 7/31/21 at 11:54 AM EDT

## 2021-07-31 NOTE — H&P
[facility] History and Physical      CHIEF COMPLAINT:  Chest pain    History of Present Illness:  49year old male recently admitted for chest pain s/p SHALONDA to LAD. He had pain in his epigastric region that was pressure like as well as left sided sharp pain. The epigastric pain resolved after the stent but the left side started again after two days. His left sided pain has been progressively worsening. A few days ago he felt short of breath. Also had the pressure pain in his epigastric region. His sob Is worse when he ambulated. A few nights ago he felt pain in his right thigh that has since resolved. No prior vte or family history of this. Adherent to dapt and didn't miss any dose. Informant(s) for H&P:patient. REVIEW OF SYSTEMS:  A comprehensive 14 point review of systems was negative except for: what is in the HPI    PMH:  Past Medical History:   Diagnosis Date    DDD (degenerative disc disease), thoracic     HNP (herniated nucleus pulposus with myelopathy), thoracic     Hypertension     MRSA (methicillin resistant staph aureus) culture positive     Osteoarthritis of thoracic spine     with myelopathy    Pseudomeningocele     postprocedural       Surgical History:  Past Surgical History:   Procedure Laterality Date    BACK SURGERY      FEMUR SURGERY Left     LAMINECTOMY         Medications Prior to Admission:    Prior to Admission medications    Medication Sig Start Date End Date Taking?  Authorizing Provider   aspirin EC 81 MG EC tablet Take 1 tablet by mouth daily 7/16/21   Andrew Laurent MD   atorvastatin (LIPITOR) 80 MG tablet Take 1 tablet by mouth nightly 7/16/21   Andrew Laurent MD   metoprolol tartrate (LOPRESSOR) 25 MG tablet Take 0.5 tablets by mouth 2 times daily 7/16/21   Andrew Laurent MD   clopidogrel (PLAVIX) 75 MG tablet Take 1 tablet by mouth daily 7/17/21   Andrew Laurent MD   hydrALAZINE (APRESOLINE) 25 MG tablet Take 25 mg by mouth 2 times daily Indications: High Blood Pressure causing Heart Disease    Historical Provider, MD   traMADol (ULTRAM) 50 MG tablet Take 50 mg by mouth every 12 hours. Historical Provider, MD   gabapentin (NEURONTIN) 300 MG capsule Take 300 mg by mouth 3 times daily    Historical Provider, MD   amLODIPine (NORVASC) 10 MG tablet Take 10 mg by mouth daily    Historical Provider, MD   sertraline (ZOLOFT) 50 MG tablet Take 50 mg by mouth daily    Historical Provider, MD       Allergies:    Fentanyl    Social History:    reports that he has never smoked. He has quit using smokeless tobacco.  His smokeless tobacco use included chew. He reports current drug use. Drug: Marijuana. Family History:   No family hx of vte      PHYSICAL EXAM:  Vitals:  /74   Pulse 69   Temp 98.1 °F (36.7 °C)   Resp 19   Ht 5' 9\" (1.753 m)   Wt 185 lb (83.9 kg)   SpO2 95%   BMI 27.32 kg/m²   General Appearance: alert and oriented to person, place and time and in no acute distress  Skin: warm and dry, turgor not diminished  Head: normocephalic and atraumatic  Eyes: pupils equal, round, and reactive to light, extraocular eye movements intact, conjunctivae normal  Neck: neck supple and non tender without mass   Pulmonary/Chest: clear to auscultation bilaterally- no wheezes, rales or rhonchi, normal air movement, no respiratory distress  Cardiovascular: normal rate, normal S1 and S2 and no M/R/R  Abdomen: soft, epigastric ttp  Extremities: no cyanosis, no clubbing and no edema. Reports mild calf tenderness bilaterally  Neurologic: no cranial nerve deficit and speech normal    LABS:  Recent Labs     07/30/21  1414      K 4.0      CO2 27   BUN 11   CREATININE 1.0   GLUCOSE 88   CALCIUM 9.0       Recent Labs     07/30/21  1414   WBC 8.2   RBC 4.41*   HGB 14.1   HCT 42.4   MCV 96.1   MCH 32.0*   MCHC 33.3   RDW 12.4      MPV 8.7       No results for input(s): POCGLU in the last 72 hours.     Radiology:   CTA PULMONARY W CONTRAST Final Result   Positive for pulmonary embolism in the right lower lobe. No evidence of   right heart strain. Findings were discussed with REJI JIN at 7:35 pm on 7/30/2021. XR CHEST (2 VW)   Final Result   1. No active pulmonary disease. VL DUP LOWER EXTREMITY VENOUS BILATERAL    (Results Pending)       EKG: no acute abnl    ASSESSMENT:    Precordial chest pain  ASHD s/p recent SHALONDA to RCA 7/15  Acute pulmonary embolism  Essential hypertension  Chronic lower back pain due to DJD with radiculopathy    PLAN:  Chest pain:  -cardiology consulted in ED  -serial troponins, repeat EKG. -continue DAPT and statin    Acute PE:   -start eliquis  -venous doppler and ddimer pending. Essential hypertension: resume home regimen    DJD of spine with radiculopathy and chronic lower back pain: resume home analgesics    Code Status: full  DVT prophylaxis: eliquis      NOTE: This report was transcribed using voice recognition software. Every effort was made to ensure accuracy; however, inadvertent computerized transcription errors may be present.   Electronically signed by Tahmina Gutierrez MD on 7/30/2021 at 8:18 PM

## 2021-07-31 NOTE — CONSULTS
Pt had recent pci  Now admitted with cp  Has PE no RV strain  Check LE v doppler  anticoag  Can be dced tomorrow as far as cardio is concerened                      Name:  Sally Murguia /Age/Sex: 1965  (49 y.o. male)   MRN & CSN:  [id number] & [id number] Admission Date/Time: 2021  4:16 PM   Location:  -A PCP: Ruy Malagon MD       Hospital Day: 2          Referring physician:  Sybil Valdez MD         Reason for consultation:  CP        Thanks for referral.    Information source: patient    CC;  CP      HPI:   Thank you for involving me in taking  care of Sally Murguia who  is a 49 y. o.year  Old male  Presents with  H/o RCA PCI   , admitted with Lt sided CP, here intial cardiac NICOLE is negative. Past medical history:    has a past medical history of DDD (degenerative disc disease), thoracic, HNP (herniated nucleus pulposus with myelopathy), thoracic, Hypertension, MRSA (methicillin resistant staph aureus) culture positive, Osteoarthritis of thoracic spine, and Pseudomeningocele. Past surgical history:   has a past surgical history that includes back surgery; Femur Surgery (Left); and laminectomy. Social History:   reports that he has never smoked. He has quit using smokeless tobacco.  His smokeless tobacco use included chew. He reports current drug use. Drug: Marijuana. Family history:  family history is not on file.     Allergies   Allergen Reactions    Fentanyl Nausea And Vomiting       oxyCODONE-acetaminophen (PERCOCET) 5-325 MG per tablet 1 tablet, Q4H PRN  amLODIPine (NORVASC) tablet 10 mg, Daily  aspirin EC tablet 81 mg, Daily  atorvastatin (LIPITOR) tablet 80 mg, Nightly  clopidogrel (PLAVIX) tablet 75 mg, Daily  gabapentin (NEURONTIN) capsule 300 mg, TID  hydrALAZINE (APRESOLINE) tablet 25 mg, BID  metoprolol tartrate (LOPRESSOR) tablet 12.5 mg, BID  sertraline (ZOLOFT) tablet 50 mg, Daily  sodium chloride flush 0.9 % injection 5-40 mL, BID  sodium chloride flush 0.9 % injection 5-40 mL, PRN  0.9 % sodium chloride infusion, PRN  ondansetron (ZOFRAN-ODT) disintegrating tablet 4 mg, Q8H PRN   Or  ondansetron (ZOFRAN) injection 4 mg, Q6H PRN  acetaminophen (TYLENOL) tablet 650 mg, Q6H PRN   Or  acetaminophen (TYLENOL) suppository 650 mg, Q6H PRN  polyethylene glycol (GLYCOLAX) packet 17 g, Daily PRN  apixaban (ELIQUIS) tablet 10 mg, BID   Followed by  Siri Zapien ON 8/7/2021] apixaban (ELIQUIS) tablet 5 mg, BID      Current Facility-Administered Medications   Medication Dose Route Frequency Provider Last Rate Last Admin    oxyCODONE-acetaminophen (PERCOCET) 5-325 MG per tablet 1 tablet  1 tablet Oral Q4H PRN DIYA Isaac - CNP   1 tablet at 07/31/21 1129    amLODIPine (NORVASC) tablet 10 mg  10 mg Oral Daily Luz Scott MD   10 mg at 07/31/21 5771    aspirin EC tablet 81 mg  81 mg Oral Daily Luz Scott MD   81 mg at 07/31/21 0909    atorvastatin (LIPITOR) tablet 80 mg  80 mg Oral Nightly Luz Scott MD   80 mg at 07/30/21 2323    clopidogrel (PLAVIX) tablet 75 mg  75 mg Oral Daily Luz Scott MD   75 mg at 07/31/21 8266    gabapentin (NEURONTIN) capsule 300 mg  300 mg Oral TID Nikita Salas MD   300 mg at 07/31/21 6531    hydrALAZINE (APRESOLINE) tablet 25 mg  25 mg Oral BID Luz Scott MD   25 mg at 07/31/21 0907    metoprolol tartrate (LOPRESSOR) tablet 12.5 mg  12.5 mg Oral BID Luz Scott MD   12.5 mg at 07/31/21 0908    sertraline (ZOLOFT) tablet 50 mg  50 mg Oral Daily Luz Scott MD   50 mg at 07/31/21 0909    sodium chloride flush 0.9 % injection 5-40 mL  5-40 mL Intravenous BID Luz Scott MD   10 mL at 07/31/21 0910    sodium chloride flush 0.9 % injection 5-40 mL  5-40 mL Intravenous PRN Luz Scott MD   10 mL at 07/31/21 0905    0.9 % sodium chloride infusion  25 mL Intravenous PRN Luz Scott MD        ondansetron (ZOFRAN-ODT) disintegrating tablet 4 mg  4 mg Oral Q8H PRN Nikita Salas MD        Or   Benna ALKPHOS 107     No results for input(s): TROPONINI in the last 72 hours.   No results found for: BNP  No results found for: INR, PROTIME        Assessment/Recommendations:     - CP: serial trops, EKGs   - CTA showed PE, CPM, [facility] from [facility],   - Ps says has Lt sided CP still, ? nocardiac lynn  - Risk factormodification  Will FU         Mika Calvert MD, 7/31/2021 12:02 PM

## 2021-08-01 VITALS
DIASTOLIC BLOOD PRESSURE: 89 MMHG | RESPIRATION RATE: 15 BRPM | HEIGHT: 69 IN | SYSTOLIC BLOOD PRESSURE: 143 MMHG | BODY MASS INDEX: 24.82 KG/M2 | WEIGHT: 167.6 LBS | HEART RATE: 62 BPM | OXYGEN SATURATION: 97 % | TEMPERATURE: 97.6 F

## 2021-08-01 PROBLEM — I26.93 SINGLE SUBSEGMENTAL PULMONARY EMBOLISM WITHOUT ACUTE COR PULMONALE (HCC): Status: ACTIVE | Noted: 2021-08-01

## 2021-08-01 PROCEDURE — G0378 HOSPITAL OBSERVATION PER HR: HCPCS

## 2021-08-01 PROCEDURE — APPSS45 APP SPLIT SHARED TIME 31-45 MINUTES: Performed by: NURSE PRACTITIONER

## 2021-08-01 PROCEDURE — 6370000000 HC RX 637 (ALT 250 FOR IP): Performed by: NURSE PRACTITIONER

## 2021-08-01 PROCEDURE — 6370000000 HC RX 637 (ALT 250 FOR IP): Performed by: INTERNAL MEDICINE

## 2021-08-01 PROCEDURE — 94761 N-INVAS EAR/PLS OXIMETRY MLT: CPT

## 2021-08-01 RX ORDER — OXYCODONE HYDROCHLORIDE AND ACETAMINOPHEN 5; 325 MG/1; MG/1
1 TABLET ORAL EVERY 4 HOURS PRN
Qty: 12 TABLET | Refills: 0 | Status: SHIPPED | OUTPATIENT
Start: 2021-08-01 | End: 2021-08-04

## 2021-08-01 RX ADMIN — ASPIRIN 81 MG: 81 TABLET, COATED ORAL at 09:04

## 2021-08-01 RX ADMIN — OXYCODONE HYDROCHLORIDE AND ACETAMINOPHEN 1 TABLET: 5; 325 TABLET ORAL at 01:16

## 2021-08-01 RX ADMIN — GABAPENTIN 300 MG: 300 CAPSULE ORAL at 09:04

## 2021-08-01 RX ADMIN — METOPROLOL TARTRATE 12.5 MG: 25 TABLET, FILM COATED ORAL at 09:05

## 2021-08-01 RX ADMIN — AMLODIPINE BESYLATE 10 MG: 10 TABLET ORAL at 09:05

## 2021-08-01 RX ADMIN — CLOPIDOGREL BISULFATE 75 MG: 75 TABLET ORAL at 09:07

## 2021-08-01 RX ADMIN — HYDRALAZINE HYDROCHLORIDE 25 MG: 25 TABLET, FILM COATED ORAL at 09:04

## 2021-08-01 RX ADMIN — SERTRALINE HYDROCHLORIDE 50 MG: 50 TABLET ORAL at 09:05

## 2021-08-01 RX ADMIN — APIXABAN 10 MG: 5 TABLET, FILM COATED ORAL at 09:04

## 2021-08-01 ASSESSMENT — PAIN DESCRIPTION - ORIENTATION: ORIENTATION: LEFT

## 2021-08-01 ASSESSMENT — PAIN DESCRIPTION - PAIN TYPE: TYPE: ACUTE PAIN

## 2021-08-01 ASSESSMENT — PAIN SCALES - GENERAL: PAINLEVEL_OUTOF10: 7

## 2021-08-01 ASSESSMENT — PAIN DESCRIPTION - LOCATION: LOCATION: CHEST

## 2021-08-01 ASSESSMENT — PAIN DESCRIPTION - DESCRIPTORS: DESCRIPTORS: PRESSURE;DISCOMFORT

## 2021-08-01 ASSESSMENT — PAIN DESCRIPTION - FREQUENCY: FREQUENCY: CONTINUOUS

## 2021-08-01 ASSESSMENT — PAIN DESCRIPTION - PROGRESSION: CLINICAL_PROGRESSION: GRADUALLY WORSENING

## 2021-08-01 ASSESSMENT — PAIN DESCRIPTION - ONSET: ONSET: ON-GOING

## 2021-08-01 NOTE — DISCHARGE SUMMARY
Physician Discharge Summary     Patient ID:  Joe Pozo  [id number]  59 y.o.  1965    Admit date: 7/30/2021    Discharge date and time: 08/01/21    Admitting Physician: Nikita Salas MD     Discharge Physician: Florinda Villa CNP    Admission Diagnoses: Precordial chest pain [R07.2]  Chest pain, unspecified type [R07.9]  Other acute pulmonary embolism, unspecified whether acute cor pulmonale present [facility]) [I26.99]    Discharge Diagnoses:  Chest pain  CAD  Acute pulmonary embolism  Admission Condition: fair    Discharged Condition: good    Indication for Admission: Chest pain    Hospital Course:   61-year-old male recent history of recent LHC with stents presented with chest pain. Trop negative. EKG has no acute ST-T change. CTA showed right side pulmonary embolism without right heart strain. Patient was started on Eliquis. Cardiology consulted. Recommend continue DAPT, statin, and BB. With treatment, patient chest pain resolved. On the discharge day, patient vital signs were stable, afebrile, labs were unremarkable. He is stable for discharge. Patient was instructed to continue take Eliquis 10 mg twice daily for 6 more days, followed by 5 mg twice daily for at least 6 months. Patient will follow up with PCP, PCP with decide how long the patient needs to be anticoagulated. Consults: cardiology    Significant Diagnostic Studies: Lab work, CTA of the chest.  EKG. Outstanding Order Results     No orders found from 7/1/2021 to 7/31/2021. Treatments: Medical management including anticoagulation.     Discharge Exam:  BP (!) 143/89   Pulse 62   Temp 97.6 °F (36.4 °C) (Axillary)   Resp 15   Ht 5' 9\" (1.753 m)   Wt 167 lb 9.6 oz (76 kg)   SpO2 98%   BMI 24.75 kg/m²     General Appearance:    Alert, cooperative, no distress, appears stated age   Head:    Normocephalic, without obvious abnormality, atraumatic   Eyes:    PERRL, conjunctiva/corneas clear, EOM's intact, fundi     benign, both eyes        Ears:    Normal TM's and external ear canals, both ears   Nose:   Nares normal, septum midline, mucosa normal, no drainage    or sinus tenderness   Throat:   Lips, mucosa, and tongue normal; teeth and gums normal   Neck:   Supple, symmetrical, trachea midline, no adenopathy;        thyroid:  No enlargement/tenderness/nodules; no carotid    bruit or JVD   Back:     Symmetric, no curvature, ROM normal, no CVA tenderness   Lungs:     Clear to auscultation bilaterally, respirations unlabored   Chest wall:    No tenderness or deformity   Heart:    Regular rate and rhythm, S1 and S2 normal, no murmur, rub   or gallop   Abdomen:     Soft, non-tender, bowel sounds active all four quadrants,     no masses, no organomegaly   Genitalia:    Normal male without lesion, discharge or tenderness   Rectal:    Normal tone, normal prostate, no masses or tenderness;    guaiac negative stool   Extremities:   Extremities normal, atraumatic, no cyanosis or edema   Pulses:   2+ and symmetric all extremities   Skin:   Skin color, texture, turgor normal, no rashes or lesions   Lymph nodes:   Cervical, supraclavicular, and axillary nodes normal   Neurologic:   CNII-XII intact. Normal strength, sensation and reflexes       throughout        Medication List      START taking these medications    * apixaban 5 MG Tabs tablet  Commonly known as: ELIQUIS  Take 2 tablets by mouth 2 times daily for 6 days     * apixaban 5 MG Tabs tablet  Commonly known as: ELIQUIS  Take 1 tablet by mouth 2 times daily  Start taking on: August 7, 2021     oxyCODONE-acetaminophen 5-325 MG per tablet  Commonly known as: PERCOCET  Take 1 tablet by mouth every 4 hours as needed for Pain for up to 3 days. * This list has 2 medication(s) that are the same as other medications prescribed for you. Read the directions carefully, and ask your doctor or other care provider to review them with you.             CONTINUE taking these medications    amLODIPine 10 MG tablet  Commonly known as: NORVASC     aspirin EC 81 MG EC tablet  Take 1 tablet by mouth daily     atorvastatin 80 MG tablet  Commonly known as: LIPITOR  Take 1 tablet by mouth nightly     clopidogrel 75 MG tablet  Commonly known as: PLAVIX  Take 1 tablet by mouth daily     gabapentin 300 MG capsule  Commonly known as: NEURONTIN     hydrALAZINE 25 MG tablet  Commonly known as: APRESOLINE     metoprolol tartrate 25 MG tablet  Commonly known as: LOPRESSOR  Take 0.5 tablets by mouth 2 times daily     sertraline 50 MG tablet  Commonly known as: ZOLOFT     traMADol 50 MG tablet  Commonly known as: ULTRAM           Where to Get Your Medications      You can get these medications from any pharmacy    Bring a paper prescription for each of these medications  · apixaban 5 MG Tabs tablet  · apixaban 5 MG Tabs tablet  · oxyCODONE-acetaminophen 5-325 MG per tablet           Disposition: home    Patient Instructions:   [unfilled]  Activity: activity as tolerated  Diet: cardiac diet  Wound Care: none needed    Follow-up with ECP in 1 week.     Signed:  DIYA Fernando CNP  8/1/2021  10:40 AM

## 2021-08-01 NOTE — PROGRESS NOTES
Cardiology Progress Note     Today's Plan can be discharged from cardiology standpoint. We will sign off follow-up as needed    Admit Date:  7/30/2021    Consult reason/ Seen today for: chest pain    Subjective and  Overnight Events: Feeling well. Denies any further chest pain or shortness of breath. Assessment / Plan / Recommendation:     1. Chest pain-troponin negative x3-EKG sinus rhythm no acute ST or T wave abnormalities appreciated: Compared to previous EKG, no significant changes are noted. 2. CTA positive for pulmonary emboli right lower lobe. recommend check-VQ scan which can be done as an outpatient. Continue with anticoagulation. Venous Doppler negative for DVT  3. CAD -underwent PCI of RCA 7/15/2021-EKG no acute changes. Continue with DAPT aspirin and Plavix: After 30 days consider discontinuation of aspirin to avoid triple therapy. continue with metoprolol  4. Hypertension-stable continue with amlodipine and hydralazine        History of Presenting Illness:    Chief complain on admission : 49 y. o.year old who is admitted for  Chief Complaint   Patient presents with    Chest Pain     chest pain, reports stent placed 2 weeks ago. Chest pain last couple days. Past medical history:    has a past medical history of DDD (degenerative disc disease), thoracic, HNP (herniated nucleus pulposus with myelopathy), thoracic, Hypertension, MRSA (methicillin resistant staph aureus) culture positive, Osteoarthritis of thoracic spine, and Pseudomeningocele. Past surgical history:   has a past surgical history that includes back surgery; Femur Surgery (Left); and laminectomy. Social History:   reports that he has never smoked. He has quit using smokeless tobacco.  His smokeless tobacco use included chew. He reports current drug use. Drug: Marijuana. Family history:  family history is not on file.     Allergies   Allergen Reactions    Fentanyl Nausea And Vomiting       Review of Systems:   All 14 systems were reviewed and are negative  Except for the positive findings which are documented     BP (!) 143/89   Pulse 62   Temp 97.6 °F (36.4 °C) (Axillary)   Resp 15   Ht 5' 9\" (1.753 m)   Wt 167 lb 9.6 oz (76 kg)   SpO2 98%   BMI 24.75 kg/m²       Intake/Output Summary (Last 24 hours) at 8/1/2021 1015  Last data filed at 7/31/2021 1101  Gross per 24 hour   Intake 0 ml   Output --   Net 0 ml       Physical Exam:  Physical Exam  Vitals reviewed. Constitutional:       Appearance: Normal appearance. HENT:      Head: Normocephalic. Mouth/Throat:      Mouth: Mucous membranes are moist.   Cardiovascular:      Rate and Rhythm: Normal rate and regular rhythm. Pulses: Normal pulses. Heart sounds: Normal heart sounds. Pulmonary:      Effort: Pulmonary effort is normal.      Breath sounds: Normal breath sounds. Abdominal:      Palpations: Abdomen is soft. Musculoskeletal:         General: Normal range of motion. Skin:     General: Skin is warm and dry. Capillary Refill: Capillary refill takes less than 2 seconds. Neurological:      General: No focal deficit present. Mental Status: He is alert and oriented to person, place, and time.    Psychiatric:         Behavior: Behavior normal.          Medications:    amLODIPine  10 mg Oral Daily    aspirin EC  81 mg Oral Daily    atorvastatin  80 mg Oral Nightly    clopidogrel  75 mg Oral Daily    gabapentin  300 mg Oral TID    hydrALAZINE  25 mg Oral BID    metoprolol tartrate  12.5 mg Oral BID    sertraline  50 mg Oral Daily    sodium chloride flush  5-40 mL Intravenous BID    apixaban  10 mg Oral BID    Followed by   Terrie Bui ON 8/7/2021] apixaban  5 mg Oral BID      sodium chloride       oxyCODONE-acetaminophen, sodium chloride flush, sodium chloride, ondansetron **OR** ondansetron, acetaminophen **OR** acetaminophen, polyethylene glycol    Lab Data:  CBC:   Recent Labs     07/30/21  1414 07/31/21  0515   WBC 8.2 9.6   HGB 14.1 13.3*   HCT 42.4 39.9*   MCV 96.1 95.5    256     BMP:   Recent Labs     07/30/21  1414      K 4.0      CO2 27   BUN 11   CREATININE 1.0     PT/INR: No results for input(s): PROTIME, INR in the last 72 hours. BNP:  No results for input(s): PROBNP in the last 72 hours. TROPONIN:   Recent Labs     07/30/21  1414 07/30/21  2329 07/31/21  0515   TROPONINT <0.010 <0.010 <0.010              Impression:  Active Problems:    Chest pain    Precordial chest pain  Resolved Problems:    * No resolved hospital problems. *       All labs, medications and tests reviewed by myself, continue all other medications of all above medical condition listed as is except for changes mentioned above. Thank you   Please call with questions. Electronically signed by DIYA CARRIZALES CNP on 8/1/2021 at 10:15 AM  I have seen ,spoken to  and examined this patient personally, independently of the nurse practitioner. I have reviewed the hospital care given to date and reviewed all pertinent labs and imaging. The plan was developed mutually at the time of the visit with the patient,  NP  and myself. I have spoken with patient, nursing staff and provided written and verbal instructions . The above note has been reviewed and I agree with the assessment, diagnosis, and treatment plan with changes made by me as follows     CARDIOLOGY ATTENDING ADDENDUM    HPI:  I have reviewed the above HPI  And agree with above   Annie Stinson is a 49 y. o.year old who and presents with had concerns including Chest Pain (chest pain, reports stent placed 2 weeks ago. Chest pain last couple days. ). Chief Complaint   Patient presents with    Chest Pain     chest pain, reports stent placed 2 weeks ago. Chest pain last couple days.       Interval history:  No cp    Physical Exam:  General:  Awake, alert, NAD  Head:normal  Eye:normal  Neck:  No JVD   Chest:  Clear

## 2021-09-23 ENCOUNTER — OFFICE VISIT (OUTPATIENT)
Dept: CARDIOLOGY CLINIC | Age: 56
End: 2021-09-23
Payer: OTHER GOVERNMENT

## 2021-09-23 VITALS
WEIGHT: 196 LBS | HEIGHT: 69 IN | SYSTOLIC BLOOD PRESSURE: 116 MMHG | HEART RATE: 65 BPM | DIASTOLIC BLOOD PRESSURE: 84 MMHG | BODY MASS INDEX: 29.03 KG/M2

## 2021-09-23 DIAGNOSIS — I10 ESSENTIAL HYPERTENSION: ICD-10-CM

## 2021-09-23 DIAGNOSIS — I25.10 CORONARY ARTERY DISEASE INVOLVING NATIVE CORONARY ARTERY OF NATIVE HEART WITHOUT ANGINA PECTORIS: Primary | ICD-10-CM

## 2021-09-23 DIAGNOSIS — E78.5 DYSLIPIDEMIA: ICD-10-CM

## 2021-09-23 DIAGNOSIS — I26.93 SINGLE SUBSEGMENTAL PULMONARY EMBOLISM WITHOUT ACUTE COR PULMONALE (HCC): ICD-10-CM

## 2021-09-23 DIAGNOSIS — R07.9 CHEST PAIN, UNSPECIFIED TYPE: ICD-10-CM

## 2021-09-23 PROCEDURE — 99214 OFFICE O/P EST MOD 30 MIN: CPT | Performed by: INTERNAL MEDICINE

## 2021-09-23 PROCEDURE — 93000 ELECTROCARDIOGRAM COMPLETE: CPT | Performed by: INTERNAL MEDICINE

## 2021-09-23 NOTE — PROGRESS NOTES
CP deep sharp pain, pt states he can feel the stent the weight of heart . Always there.  SOB at anytime,

## 2021-09-23 NOTE — PROGRESS NOTES
Malini Ontiveros MD                                  CARDIOLOGY  NOTE        Referring Physician: Glory Reina MD    Thank you for consultation. Chief Complaint:    Chief Complaint   Patient presents with    Follow-up     CP deep sharp pain, pt states he can feel the stent the weight of heart . Always there. SOB at anytime,     Chest Pain    Shortness of Breath    Edema        HPI:     Jasmeet Pritchard is a 49y.o. year old male who was recently evaluated in the hospital in July 2021 for chest pain and shortness of breath. Patient underwent left heart cardiac catheterization s/p PCI to RCA    Presents for follow-up today    Echo 7/16/2021         Summary   Left ventricular systolic function is normal.   Ejection fraction is visually estimated at 50-55%. No significant valvular disease noted. Current Outpatient Medications   Medication Sig Dispense Refill    apixaban (ELIQUIS) 5 MG TABS tablet Take 1 tablet by mouth 2 times daily 60 tablet 5    atorvastatin (LIPITOR) 80 MG tablet Take 1 tablet by mouth nightly 90 tablet 3    metoprolol tartrate (LOPRESSOR) 25 MG tablet Take 0.5 tablets by mouth 2 times daily 120 tablet 3    clopidogrel (PLAVIX) 75 MG tablet Take 1 tablet by mouth daily 90 tablet 3    hydrALAZINE (APRESOLINE) 25 MG tablet Take 25 mg by mouth 2 times daily Indications: High Blood Pressure causing Heart Disease      traMADol (ULTRAM) 50 MG tablet Take 50 mg by mouth every 12 hours.  gabapentin (NEURONTIN) 300 MG capsule Take 300 mg by mouth 3 times daily      amLODIPine (NORVASC) 10 MG tablet Take 10 mg by mouth daily      sertraline (ZOLOFT) 50 MG tablet Take 50 mg by mouth daily       No current facility-administered medications for this visit.        Allergies:     Fentanyl    Patient History:    Past Medical History:   Diagnosis Date    DDD (degenerative disc disease), thoracic     HNP (herniated nucleus pulposus with myelopathy), thoracic     Hypertension     MRSA (methicillin resistant staph aureus) culture positive     Osteoarthritis of thoracic spine     with myelopathy    Pseudomeningocele     postprocedural     Past Surgical History:   Procedure Laterality Date    BACK SURGERY      FEMUR SURGERY Left     LAMINECTOMY       Family History   Problem Relation Age of Onset    Coronary Art Dis Neg Hx      Social History     Tobacco Use    Smoking status: Never Smoker    Smokeless tobacco: Former User     Types: Chew   Substance Use Topics    Alcohol use: Not on file     Comment: rare        Review of Systems:     · Constitutional:  No Fever or Weight Loss   · Eyes: No Decreased Vision  · ENT: No Headaches, Hearing Loss or Vertigo  · Cardiovascular: + Chest Pain,  No Shortness of breath, No Palpitations. No Edema   · Respiratory: No cough or wheezing . No Respiratory distress   · Gastrointestinal: No abdominal pain, appetite loss, blood in stools, constipation, diarrhea or heartburn  · Genitourinary: No dysuria, trouble voiding, or hematuria  · Musculoskeletal:  denies any new  joint aches , or pain   · Integumentary: No rash or pruritis  · Neurological: No TIA or stroke symptoms  · Psychiatric: No anxiety or depression  · Endocrine: No malaise, fatigue or temperature intolerance  · Hematologic/Lymphatic: No bleeding problems, blood clots or swollen lymph nodes  · Allergic/Immunologic: No nasal congestion or hives        Objective:      Physical Exam:    /84   Pulse 65   Ht 5' 9\" (1.753 m)   Wt 196 lb (88.9 kg)   BMI 28.94 kg/m²   Wt Readings from Last 3 Encounters:   09/23/21 196 lb (88.9 kg)   07/31/21 167 lb 9.6 oz (76 kg)   07/16/21 197 lb 2 oz (89.4 kg)     Body mass index is 28.94 kg/m². Vitals:    09/23/21 1502   BP: 116/84   Pulse: 65        General Appearance and Constitutional: Conversant, Well developed, Well nourished, No acute distress, Non-toxic appearance.    HEENT:  Normocephalic, Atraumatic, Bilateral external ears normal, Oropharynx moist, No oral exudates,   Nose normal.   Neck- Normal range of motion, No tenderness, Supple  Eyes:  EOMI, Conjunctiva normal, No discharge. Respiratory:  Normal breath sounds, No respiratory distress, No wheezing, No Rales, No Ronchi.  + chest tenderness. Cardiovascular: S1-S2, no added heart sounds, No Mumurs appreciated. No gallops, rubs. No Pedal Edema   GI:  Bowel sounds normal, Soft, No tenderness,  :  No costovertebral angle tenderness   Musculoskeletal:  No gross deformities. Back- No tenderness  Integument:  Well hydrated, no rash   Lymphatic:  No lymphadenopathy noted   Neurologic:  Alert & oriented x 3, Normal motor function, normal sensory function, no focal deficits noted   Psychiatric:  Speech and behavior appropriate       Medical decision making and Data review:    DATA:    Lab Results   Component Value Date    TROPONINT <0.010 07/31/2021     BNP:    Lab Results   Component Value Date    PROBNP 123.8 07/14/2021     PT/INR:  No results found for: PTINR  No results found for: LABA1C  No results found for: CHOL, TRIG, HDL, LDLCALC, LDLDIRECT  Lab Results   Component Value Date    WBC 9.6 07/31/2021    HGB 13.3 (L) 07/31/2021    HCT 39.9 (L) 07/31/2021    MCV 95.5 07/31/2021     07/31/2021     TSH: No results found for: TSH  Lab Results   Component Value Date    AST 23 07/30/2021    ALT 21 07/30/2021    BILIDIR 0.2 07/14/2021    BILITOT 1.0 07/30/2021    ALKPHOS 107 07/30/2021         All labs, medications and tests reviewed by myself including data and history from outside source , patient and available family . 1. Coronary artery disease involving native coronary artery of native heart without angina pectoris    2. Chest pain, unspecified type    3. Single subsegmental pulmonary embolism without acute cor pulmonale (HCC)    4. Essential hypertension    5. Dyslipidemia         Impression and Plan:      1. CAD s/p PCI to RCA: Stable CAD. Cont Plavix, metoprolol 25 mg, Norvasc 10 mg.   Lipitor 80 mg  2. CP: MSK in origin. Reproducible Left 3rd chondral junction. Advise as needed Tylenol or Aleve. No ischemic cardiac work-up planned. 3. Essential Hypertension: Cont Metoprolol, hydralazine, Norvasc. Blood pressure stable. 4. Unprovoked acute pulmonary embolism noted on CTA. On Eliquis. We will stop aspirin and continue with Plavix plus Eliquis. Patient gets his medication from the [facility]  5. Hyperlipidemia: Continue with high intensity statin, Lipitor 80 mg daily       Return in about 6 months (around 3/23/2022). Counseled extensively and medication compliance urged. We discussed that for the  prevention of ASCVD our  goal is aggressive risk modification. Patient is encouraged to exercise even a brisk walk for 30 minutes  at least 3 to 4 times a week   Various goals were discussed and questions answered. Continue current medications. Appropriate prescriptions are addressed and refills ordered. Questions answered and patient verbalizes understanding. Call for any problems, questions, or concerns.

## 2021-09-27 ENCOUNTER — TELEPHONE (OUTPATIENT)
Dept: CARDIOLOGY CLINIC | Age: 56
End: 2021-09-27